# Patient Record
Sex: FEMALE | Race: WHITE | NOT HISPANIC OR LATINO | Employment: FULL TIME | URBAN - METROPOLITAN AREA
[De-identification: names, ages, dates, MRNs, and addresses within clinical notes are randomized per-mention and may not be internally consistent; named-entity substitution may affect disease eponyms.]

---

## 2018-03-01 ENCOUNTER — AMBULATORY CONVERSION ENCOUNTER (OUTPATIENT)
Dept: FAMILY MEDICINE | Facility: CLINIC | Age: 33
End: 2018-03-01

## 2018-04-23 RX ORDER — ARMODAFINIL 150 MG/1
150 TABLET ORAL DAILY
Qty: 30 TABLET | Refills: 0 | Status: SHIPPED | OUTPATIENT
Start: 2018-04-23 | End: 2018-04-23 | Stop reason: SDUPTHER

## 2018-04-23 RX ORDER — ARMODAFINIL 150 MG/1
150 TABLET ORAL DAILY
COMMUNITY
Start: 2017-12-12 | End: 2018-04-23 | Stop reason: SDUPTHER

## 2018-04-23 RX ORDER — ARMODAFINIL 150 MG/1
150 TABLET ORAL DAILY
Qty: 30 TABLET | Refills: 0 | Status: SHIPPED | OUTPATIENT
Start: 2018-04-23 | End: 2018-05-21 | Stop reason: SDUPTHER

## 2018-05-17 PROBLEM — G47.26 SHIFT WORK SLEEP DISORDER: Status: ACTIVE | Noted: 2017-10-18

## 2018-05-17 PROBLEM — L70.0 ACNE VULGARIS: Status: ACTIVE | Noted: 2017-10-18

## 2018-05-17 RX ORDER — MULTIVITAMIN
TABLET ORAL
COMMUNITY

## 2018-05-17 RX ORDER — TRETINOIN 0.5 MG/G
0.05 CREAM TOPICAL
COMMUNITY
Start: 2017-10-18

## 2018-05-21 ENCOUNTER — OFFICE VISIT (OUTPATIENT)
Dept: FAMILY MEDICINE | Facility: CLINIC | Age: 33
End: 2018-05-21
Attending: FAMILY MEDICINE
Payer: COMMERCIAL

## 2018-05-21 VITALS
SYSTOLIC BLOOD PRESSURE: 108 MMHG | HEART RATE: 68 BPM | TEMPERATURE: 98.4 F | WEIGHT: 123.4 LBS | OXYGEN SATURATION: 98 % | DIASTOLIC BLOOD PRESSURE: 70 MMHG | RESPIRATION RATE: 16 BRPM

## 2018-05-21 DIAGNOSIS — G47.26 SHIFT WORK SLEEP DISORDER: Primary | ICD-10-CM

## 2018-05-21 PROCEDURE — 99213 OFFICE O/P EST LOW 20 MIN: CPT | Performed by: FAMILY MEDICINE

## 2018-05-21 RX ORDER — ARMODAFINIL 150 MG/1
150 TABLET ORAL DAILY
Qty: 30 TABLET | Refills: 0 | Status: SHIPPED | OUTPATIENT
Start: 2018-05-21 | End: 2018-07-16 | Stop reason: SDUPTHER

## 2018-05-21 ASSESSMENT — ENCOUNTER SYMPTOMS
LIGHT-HEADEDNESS: 0
WEAKNESS: 0
VOMITING: 0
NUMBNESS: 0
SHORTNESS OF BREATH: 0
DIARRHEA: 0
DIZZINESS: 0
FREQUENCY: 0
COUGH: 0
ABDOMINAL DISTENTION: 0
SLEEP DISTURBANCE: 1
PALPITATIONS: 0
FATIGUE: 1
APPETITE CHANGE: 0
WHEEZING: 0
DIFFICULTY URINATING: 0
CONSTIPATION: 0
ACTIVITY CHANGE: 0
NAUSEA: 0
ABDOMINAL PAIN: 0

## 2018-05-21 NOTE — PROGRESS NOTES
Subjective      Patient ID: Alliana K Kalisch is a 32 y.o. female.    HPI  Started working at [employer] in January  Works 11 pm to 11 am or 3 pm to 3 am  Working 4 days a week  Taking Nuvigil on days she works and on days she doesn't work  She will take half on those ams  Thinks she had a gallbladder attack a few months ago after fatty meal  The following have been reviewed and updated as appropriate in this visit:  Allergies  Meds  Problems       Review of Systems   Constitutional: Positive for fatigue. Negative for activity change and appetite change.   Respiratory: Negative for cough, shortness of breath and wheezing.    Cardiovascular: Negative for chest pain, palpitations and leg swelling.   Gastrointestinal: Negative for abdominal distention, abdominal pain, constipation, diarrhea, nausea and vomiting.   Endocrine: Negative for cold intolerance and heat intolerance.   Genitourinary: Negative for difficulty urinating and frequency.   Skin: Negative for rash.   Neurological: Negative for dizziness, weakness, light-headedness and numbness.   Psychiatric/Behavioral: Positive for sleep disturbance.       Objective     Vitals:    05/21/18 0927   BP: 108/70   Pulse: 68   Resp: 16   Temp: 36.9 °C (98.4 °F)   TempSrc: Oral   SpO2: 98%   Weight: 56 kg (123 lb 6.4 oz)     There is no height or weight on file to calculate BMI.    Physical Exam   Constitutional: She is oriented to person, place, and time. She appears well-developed and well-nourished.   Neck: No JVD present. Carotid bruit is not present.   Cardiovascular: Normal rate, regular rhythm and normal heart sounds.    No murmur heard.  Pulmonary/Chest: Effort normal and breath sounds normal. No respiratory distress. She has no wheezes.   Abdominal: Soft. Bowel sounds are normal. She exhibits no distension and no mass. There is no tenderness. There is no rebound and no guarding. No hernia.   Musculoskeletal: She exhibits no edema or tenderness.    Lymphadenopathy:     She has no cervical adenopathy.   Neurological: She is alert and oriented to person, place, and time.   Skin: Skin is warm and dry.   Psychiatric: She has a normal mood and affect.   Nursing note and vitals reviewed.      Assessment/Plan   Problem List Items Addressed This Visit     Shift work sleep disorder - Primary     PMPAWARxE website queried no abnormalities noted.  Patient is only receiving controlled substances from my office.  With current regimen there are no side effects noticed and no signs of addiction.  There is no drug seeking behavior.  The medications allow patient to perform activities of daily living  Nuvigil sent electronically         Relevant Medications    armodafinil (NUVIGIL) 150 mg tablet        Return in about 6 months (around 11/21/2018).  PVU

## 2018-05-21 NOTE — ASSESSMENT & PLAN NOTE
[employer] website queried no abnormalities noted.  Patient is only receiving controlled substances from my office.  With current regimen there are no side effects noticed and no signs of addiction.  There is no drug seeking behavior.  The medications allow patient to perform activities of daily living  Nuvigil sent electronically

## 2018-07-16 DIAGNOSIS — G47.26 SHIFT WORK SLEEP DISORDER: ICD-10-CM

## 2018-07-16 RX ORDER — ARMODAFINIL 150 MG/1
TABLET ORAL
Qty: 30 TABLET | Refills: 0 | Status: SHIPPED | OUTPATIENT
Start: 2018-07-16 | End: 2018-09-13 | Stop reason: SDUPTHER

## 2018-07-16 NOTE — TELEPHONE ENCOUNTER
PMP[employer] website queried no abnormalities noted.  Patient is only receiving controlled substances from my office  Prescription sent electronically

## 2018-08-02 ENCOUNTER — OFFICE VISIT (OUTPATIENT)
Dept: FAMILY MEDICINE | Facility: CLINIC | Age: 33
End: 2018-08-02
Payer: COMMERCIAL

## 2018-08-02 VITALS
BODY MASS INDEX: 20.62 KG/M2 | TEMPERATURE: 98.6 F | HEART RATE: 80 BPM | HEIGHT: 65 IN | RESPIRATION RATE: 16 BRPM | DIASTOLIC BLOOD PRESSURE: 62 MMHG | OXYGEN SATURATION: 98 % | SYSTOLIC BLOOD PRESSURE: 110 MMHG | WEIGHT: 123.8 LBS

## 2018-08-02 DIAGNOSIS — N92.6 IRREGULAR MENSES: Primary | ICD-10-CM

## 2018-08-02 PROCEDURE — 99213 OFFICE O/P EST LOW 20 MIN: CPT | Performed by: FAMILY MEDICINE

## 2018-08-02 RX ORDER — LEVONORGESTREL/ETHIN.ESTRADIOL 0.1-0.02MG
1 TABLET ORAL DAILY
Qty: 28 TABLET | Refills: 11 | Status: SHIPPED | OUTPATIENT
Start: 2018-08-02 | End: 2018-08-03 | Stop reason: SDUPTHER

## 2018-08-02 NOTE — PROGRESS NOTES
"Subjective      Patient ID: Alliana K Kalisch is a 32 y.o. female.    HPI  Pt presents for irregular menses  Will have it for 9 days and not get it regularly  3 to 6 weeks apart depending   Last Pap 3 years ago  Mom went through menopause before 40  The following have been reviewed and updated as appropriate in this visit:  Allergies  Meds  Problems       Review of Systems   Genitourinary: Positive for menstrual problem.       Objective     Vitals:    08/02/18 0920   BP: 110/62   BP Location: Left upper arm   Patient Position: Sitting   Pulse: 80   Resp: 16   Temp: 37 °C (98.6 °F)   TempSrc: Oral   SpO2: 98%   Weight: 56.2 kg (123 lb 12.8 oz)   Height: 1.657 m (5' 5.25\")     Body mass index is 20.44 kg/m².    Physical Exam   Constitutional: She is oriented to person, place, and time. She appears well-developed and well-nourished.   Neurological: She is alert and oriented to person, place, and time.   Psychiatric: She has a normal mood and affect.   Nursing note and vitals reviewed.      Assessment/Plan   Problem List Items Addressed This Visit     None      Visit Diagnoses     Irregular menses    -  Primary    will start aviane  check labs      Relevant Medications    levonorgestrel-ethinyl estrad (AVIANE) 0.1-20 mg-mcg per tablet    Other Relevant Orders    TSH    Estrogens, Total Serum    Progesterone    FSH/LH    Testosterone, total and free      Return after 10/18/18 for  Pe and pap.  PVU  "

## 2018-08-03 DIAGNOSIS — N92.6 IRREGULAR MENSES: ICD-10-CM

## 2018-08-03 LAB
FSH SERPL-ACNC: 6.3 MIU/ML
LH SERPL-ACNC: 12 MIU/ML
PROGEST SERPL-MCNC: 0.2 NG/ML
TESTOST FREE SERPL-MCNC: 3.8 PG/ML (ref 0–4.2)
TESTOST SERPL-MCNC: 54 NG/DL (ref 8–48)
TSH SERPL DL<=0.005 MIU/L-ACNC: 0.79 UIU/ML (ref 0.45–4.5)

## 2018-08-03 RX ORDER — LEVONORGESTREL/ETHIN.ESTRADIOL 0.1-0.02MG
1 TABLET ORAL DAILY
Qty: 84 TABLET | Refills: 3 | Status: SHIPPED | OUTPATIENT
Start: 2018-08-03 | End: 2018-09-02 | Stop reason: HOSPADM

## 2018-08-05 LAB — ESTROGEN SERPL-MCNC: 157 PG/ML

## 2018-09-13 DIAGNOSIS — G47.26 SHIFT WORK SLEEP DISORDER: ICD-10-CM

## 2018-09-13 RX ORDER — ARMODAFINIL 150 MG/1
TABLET ORAL
Qty: 30 TABLET | Refills: 0 | Status: SHIPPED | OUTPATIENT
Start: 2018-09-13 | End: 2018-10-19 | Stop reason: SDUPTHER

## 2018-10-19 ENCOUNTER — OFFICE VISIT (OUTPATIENT)
Dept: FAMILY MEDICINE | Facility: CLINIC | Age: 33
End: 2018-10-19
Payer: COMMERCIAL

## 2018-10-19 VITALS
HEIGHT: 65 IN | HEART RATE: 71 BPM | RESPIRATION RATE: 18 BRPM | TEMPERATURE: 98 F | WEIGHT: 126.6 LBS | OXYGEN SATURATION: 99 % | BODY MASS INDEX: 21.09 KG/M2 | SYSTOLIC BLOOD PRESSURE: 110 MMHG | DIASTOLIC BLOOD PRESSURE: 70 MMHG

## 2018-10-19 DIAGNOSIS — Z12.4 CERVICAL CANCER SCREENING: ICD-10-CM

## 2018-10-19 DIAGNOSIS — Z00.00 PHYSICAL EXAM: Primary | ICD-10-CM

## 2018-10-19 DIAGNOSIS — G47.26 SHIFT WORK SLEEP DISORDER: ICD-10-CM

## 2018-10-19 PROCEDURE — 99395 PREV VISIT EST AGE 18-39: CPT | Performed by: FAMILY MEDICINE

## 2018-10-19 RX ORDER — ARMODAFINIL 150 MG/1
TABLET ORAL
Qty: 30 TABLET | Refills: 0 | Status: SHIPPED | OUTPATIENT
Start: 2018-10-19 | End: 2018-12-18 | Stop reason: SDUPTHER

## 2018-10-19 RX ORDER — TIMOLOL MALEATE 5 MG/ML
SOLUTION/ DROPS OPHTHALMIC
Refills: 11 | COMMUNITY
Start: 2018-09-20 | End: 2018-10-19

## 2018-10-19 RX ORDER — LEVONORGESTREL AND ETHINYL ESTRADIOL 0.15-0.03
1 KIT ORAL DAILY
Qty: 28 TABLET | Refills: 12 | Status: SHIPPED | OUTPATIENT
Start: 2018-10-19 | End: 2020-01-16 | Stop reason: SDUPTHER

## 2018-10-19 ASSESSMENT — ENCOUNTER SYMPTOMS
COUGH: 0
NERVOUS/ANXIOUS: 0
DIZZINESS: 0
ACTIVITY CHANGE: 0
FREQUENCY: 0
SORE THROAT: 0
DYSURIA: 0
CONSTIPATION: 0
FATIGUE: 0
APNEA: 0
VOICE CHANGE: 0
DIARRHEA: 0
SLEEP DISTURBANCE: 0
APPETITE CHANGE: 0
HEADACHES: 0
WEAKNESS: 0
LIGHT-HEADEDNESS: 0
JOINT SWELLING: 0
PHOTOPHOBIA: 0
RHINORRHEA: 0
NAUSEA: 0
NUMBNESS: 0
TROUBLE SWALLOWING: 0
BACK PAIN: 0
EYE PAIN: 0
VOMITING: 0
UNEXPECTED WEIGHT CHANGE: 0
NECK PAIN: 0
CHEST TIGHTNESS: 0
ABDOMINAL PAIN: 0
ABDOMINAL DISTENTION: 0
EYE REDNESS: 0
PALPITATIONS: 0
HEMATURIA: 0
BLOOD IN STOOL: 0
EYE DISCHARGE: 0
SHORTNESS OF BREATH: 0
DIFFICULTY URINATING: 0
SINUS PAIN: 0
WHEEZING: 0

## 2018-10-19 NOTE — PROGRESS NOTES
"Subjective      Patient ID: Alliana K Kalisch is a 33 y.o. female.  1985      HPI  Pt presents for pap  New partner for about 2 months  Using condoms  Getting some spotting in between menses  Doing ok overall with Nuvigil    Never had abnormal pap last pap about 3.5 years  The following have been reviewed and updated as appropriate in this visit:  Tobacco  Allergies  Meds  Problems  Med Hx  Fam Hx  Soc Hx      Review of Systems   Constitutional: Negative for activity change, appetite change, fatigue and unexpected weight change.   HENT: Negative for congestion, ear pain, hearing loss, rhinorrhea, sinus pain, sneezing, sore throat, trouble swallowing and voice change.    Eyes: Negative for photophobia, pain, discharge, redness and visual disturbance.   Respiratory: Negative for apnea, cough, chest tightness, shortness of breath and wheezing.    Cardiovascular: Negative for chest pain, palpitations and leg swelling.   Gastrointestinal: Negative for abdominal distention, abdominal pain, blood in stool, constipation, diarrhea, nausea and vomiting.   Endocrine: Negative for cold intolerance and heat intolerance.   Genitourinary: Positive for menstrual problem (spotting). Negative for decreased urine volume, difficulty urinating, dysuria, frequency, hematuria, urgency, vaginal bleeding, vaginal discharge and vaginal pain.   Musculoskeletal: Negative for back pain, joint swelling and neck pain.   Skin: Negative for rash.   Neurological: Negative for dizziness, weakness, light-headedness, numbness and headaches.   Psychiatric/Behavioral: Negative for sleep disturbance. The patient is not nervous/anxious.        Objective     Vitals:    10/19/18 1058   BP: 110/70   Pulse: 71   Resp: 18   Temp: 36.7 °C (98 °F)   SpO2: 99%   Weight: 57.4 kg (126 lb 9.6 oz)   Height: 1.657 m (5' 5.25\")     Body mass index is 20.91 kg/m².    Physical Exam   Constitutional: She is oriented to person, place, and time. She appears " well-developed and well-nourished.   HENT:   Head: Normocephalic.   Right Ear: Tympanic membrane, external ear and ear canal normal.   Left Ear: Tympanic membrane, external ear and ear canal normal.   Nose: Nose normal.   Eyes: Conjunctivae and EOM are normal. Pupils are equal, round, and reactive to light.   Neck: No JVD present.   Cardiovascular: Normal rate and regular rhythm.    No murmur heard.  Pulmonary/Chest: Effort normal and breath sounds normal. No respiratory distress. She has no wheezes. Right breast exhibits no inverted nipple, no mass, no nipple discharge, no skin change and no tenderness. Left breast exhibits no inverted nipple, no mass, no nipple discharge, no skin change and no tenderness.   Abdominal: Soft. Bowel sounds are normal. She exhibits no distension and no mass. There is no tenderness. There is no rebound and no guarding. No hernia.   Genitourinary: Vagina normal and uterus normal. There is no rash on the right labia. There is no rash on the left labia. Cervix exhibits no motion tenderness, no discharge and no friability. Right adnexum displays no mass. Left adnexum displays no mass.   Musculoskeletal: She exhibits no edema or tenderness.   Lymphadenopathy:     She has no cervical adenopathy.   Neurological: She is alert and oriented to person, place, and time. She displays normal reflexes. No sensory deficit.   Skin: Skin is warm and dry.   Psychiatric: She has a normal mood and affect.   Nursing note and vitals reviewed.      Assessment/Plan   Problem List Items Addressed This Visit     Shift work sleep disorder     PMP[employer]AR[employer] website queried no abnormalities noted.  Patient is only receiving controlled substances from my office.  With current regimen there are no side effects noticed and no signs of addiction.  There is no drug seeking behavior.  The medications allow patient to perform activities of daily living and provide adequate anesthesia  nuvigil sent electronically          Relevant Medications    armodafinil (NUVIGIL) 150 mg tablet      Other Visit Diagnoses     Physical exam    -  Primary    diet/exercise   check labs  pap done    Relevant Orders    Lipid panel    Comprehensive metabolic panel    Chlamydia/GC RNA:ThinPrep,Urine,Swab    Cytology, Thinprep Pap    PAP AND HR HPV W/REFLEX GENOTYPING 16,18/45 & CT/NG/TV    Cervical cancer screening        pap with HPV  GC/CT due to new relationship      Return in about 6 months (around 4/19/2019).  PVU

## 2018-10-19 NOTE — ASSESSMENT & PLAN NOTE
P[employer] website queried no abnormalities noted.  Patient is only receiving controlled substances from my office.  With current regimen there are no side effects noticed and no signs of addiction.  There is no drug seeking behavior.  The medications allow patient to perform activities of daily living and provide adequate anesthesia  nuvigil sent electronically

## 2018-10-23 LAB
C TRACH RRNA CVX QL NAA+PROBE: NEGATIVE
CYTOLOGIST CVX/VAG CYTO: NORMAL
CYTOLOGY CVX/VAG DOC THIN PREP: NORMAL
DX ICD CODE: NORMAL
HPV I/H RISK 4 DNA CVX QL PROBE+SIG AMP: NEGATIVE
LAB CORP NOTE:: NORMAL
N GONORRHOEA RRNA CVX QL NAA+PROBE: NEGATIVE
OTHER STN SPEC: NORMAL
PATH REPORT.FINAL DX SPEC: NORMAL
STAT OF ADQ CVX/VAG CYTO-IMP: NORMAL
T VAGINALIS RRNA SPEC QL NAA+PROBE: NEGATIVE

## 2018-10-24 ENCOUNTER — TELEPHONE (OUTPATIENT)
Dept: FAMILY MEDICINE | Facility: CLINIC | Age: 33
End: 2018-10-24

## 2018-10-24 NOTE — TELEPHONE ENCOUNTER
----- Message from Katharine Torres DO sent at 10/23/2018  6:47 PM EDT -----  Please call Pap and HPV were negative

## 2018-12-18 DIAGNOSIS — G47.26 SHIFT WORK SLEEP DISORDER: ICD-10-CM

## 2018-12-18 RX ORDER — ARMODAFINIL 150 MG/1
TABLET ORAL
Qty: 30 TABLET | Refills: 0 | Status: SHIPPED | OUTPATIENT
Start: 2018-12-18 | End: 2019-01-28 | Stop reason: SDUPTHER

## 2019-01-28 DIAGNOSIS — G47.26 SHIFT WORK SLEEP DISORDER: ICD-10-CM

## 2019-01-28 RX ORDER — ARMODAFINIL 150 MG/1
TABLET ORAL
Qty: 30 TABLET | Refills: 0 | Status: SHIPPED | OUTPATIENT
Start: 2019-01-28 | End: 2019-03-07 | Stop reason: SDUPTHER

## 2019-01-28 NOTE — TELEPHONE ENCOUNTER
Last filled: 12/18/2018  Last seen: 10/19/018  Next appt:   please advise. Augustina Cramer MA

## 2019-02-12 ENCOUNTER — OFFICE VISIT (OUTPATIENT)
Dept: FAMILY MEDICINE | Facility: CLINIC | Age: 34
End: 2019-02-12
Payer: COMMERCIAL

## 2019-02-12 VITALS
TEMPERATURE: 98.6 F | DIASTOLIC BLOOD PRESSURE: 60 MMHG | SYSTOLIC BLOOD PRESSURE: 88 MMHG | BODY MASS INDEX: 19.82 KG/M2 | WEIGHT: 120 LBS | HEART RATE: 79 BPM | OXYGEN SATURATION: 98 %

## 2019-02-12 DIAGNOSIS — N92.6 IRREGULAR MENSES: ICD-10-CM

## 2019-02-12 DIAGNOSIS — G47.26 SHIFT WORK SLEEP DISORDER: ICD-10-CM

## 2019-02-12 DIAGNOSIS — N92.6 IRREGULAR MENSES: Primary | ICD-10-CM

## 2019-02-12 PROCEDURE — 99213 OFFICE O/P EST LOW 20 MIN: CPT | Performed by: FAMILY MEDICINE

## 2019-02-12 RX ORDER — NORELGESTROMIN AND ETHINYL ESTRADIOL 35; 150 UG/MG; UG/MG
PATCH TRANSDERMAL
Qty: 9 PATCH | Refills: 3 | Status: SHIPPED | OUTPATIENT
Start: 2019-02-12 | End: 2019-02-25

## 2019-02-12 RX ORDER — NORELGESTROMIN AND ETHINYL ESTRADIOL 35; 150 UG/MG; UG/MG
PATCH TRANSDERMAL
Qty: 3 PATCH | Refills: 12 | Status: SHIPPED | OUTPATIENT
Start: 2019-02-12 | End: 2019-02-12 | Stop reason: SDUPTHER

## 2019-02-12 ASSESSMENT — ENCOUNTER SYMPTOMS
WHEEZING: 0
DIZZINESS: 0
DIFFICULTY URINATING: 0
FREQUENCY: 0
CONSTIPATION: 0
VOMITING: 0
LIGHT-HEADEDNESS: 0
DIARRHEA: 0
FATIGUE: 0
COUGH: 0
NUMBNESS: 0
WEAKNESS: 0
SHORTNESS OF BREATH: 0
APPETITE CHANGE: 0
ABDOMINAL PAIN: 0
ABDOMINAL DISTENTION: 0
NAUSEA: 0
ACTIVITY CHANGE: 0
PALPITATIONS: 0

## 2019-02-12 NOTE — PROGRESS NOTES
Subjective      Patient ID: Alliana K Kalisch is a 33 y.o. female.  1985      HPI  Night shift work  And uses Nuvigil working more hours  And different shifts  Irregular menses  Not taking pills regularly because they make her nauseous  Would like to do patch  Had LMP 2 weeks ago intermittently  The following have been reviewed and updated as appropriate in this visit:  Allergies  Meds  Problems       Review of Systems   Constitutional: Negative for activity change, appetite change and fatigue.   Respiratory: Negative for cough, shortness of breath and wheezing.    Cardiovascular: Negative for chest pain, palpitations and leg swelling.   Gastrointestinal: Negative for abdominal distention, abdominal pain, constipation, diarrhea, nausea and vomiting.   Endocrine: Negative for cold intolerance and heat intolerance.   Genitourinary: Positive for menstrual problem (not taking pill consistently). Negative for difficulty urinating and frequency.   Skin: Negative for rash.   Neurological: Negative for dizziness, weakness, light-headedness and numbness.       Objective     Vitals:    02/12/19 1036   BP: (!) 88/60   BP Location: Left upper arm   Patient Position: Sitting   Pulse: 79   Temp: 37 °C (98.6 °F)   TempSrc: Oral   SpO2: 98%   Weight: 54.4 kg (120 lb)     Body mass index is 19.82 kg/m².    Physical Exam   Constitutional: She is oriented to person, place, and time. She appears well-developed and well-nourished.   Cardiovascular: Normal rate and regular rhythm.    No murmur heard.  Pulmonary/Chest: Effort normal. She has no wheezes.   Abdominal: Soft. There is no tenderness.   Neurological: She is alert and oriented to person, place, and time.   Psychiatric: She has a normal mood and affect.   Nursing note and vitals reviewed.      Assessment/Plan   Diagnoses and all orders for this visit:    Irregular menses (Primary)  Comments:  will switch to Ortho Evra patch and monitor  Orders:  -      norelgestromin-ethin.estradiol (ORTHO EVRA) 150-35 mcg/24 hr; Apply 1 patch each week for 3 weeks, then remove for 1 week.    Shift work sleep disorder  Assessment & Plan:  Continue nuvigil  Discussed controlled substance contract and pt signed new contract          Current Outpatient Prescriptions:   •  armodafinil (NUVIGIL) 150 mg tablet, Take 1 tablet PO as needed for night shift work, Disp: 30 tablet, Rfl: 0  •  levonorgestrel-ethinyl estrad (NORDETTE) 0.15-0.03 mg per tablet, Take 1 tablet by mouth daily., Disp: 28 tablet, Rfl: 12  •  multivitamin (THERAGRAN) tablet, take 1 tablet by oral route  every day, Disp: , Rfl:   •  tretinoin (RETIN-A) 0.05 % cream, 0.05 %., Disp: , Rfl:   •  norelgestromin-ethin.estradiol (ORTHO EVRA) 150-35 mcg/24 hr, Apply 1 patch each week for 3 weeks, then remove for 1 week., Disp: 3 patch, Rfl: 12  Return in about 6 months (around 8/12/2019).  PVU

## 2019-02-25 ENCOUNTER — OFFICE VISIT (OUTPATIENT)
Dept: FAMILY MEDICINE | Facility: CLINIC | Age: 34
End: 2019-02-25
Payer: COMMERCIAL

## 2019-02-25 ENCOUNTER — TELEPHONE (OUTPATIENT)
Dept: FAMILY MEDICINE | Facility: CLINIC | Age: 34
End: 2019-02-25

## 2019-02-25 VITALS
WEIGHT: 119 LBS | SYSTOLIC BLOOD PRESSURE: 90 MMHG | TEMPERATURE: 98.6 F | BODY MASS INDEX: 19.65 KG/M2 | OXYGEN SATURATION: 98 % | DIASTOLIC BLOOD PRESSURE: 60 MMHG | HEART RATE: 68 BPM

## 2019-02-25 DIAGNOSIS — K64.9 HEMORRHOIDS, UNSPECIFIED HEMORRHOID TYPE: Primary | ICD-10-CM

## 2019-02-25 PROCEDURE — 99212 OFFICE O/P EST SF 10 MIN: CPT | Performed by: FAMILY MEDICINE

## 2019-02-25 RX ORDER — HYDROCORTISONE ACETATE 25 MG/1
25 SUPPOSITORY RECTAL 2 TIMES DAILY
Qty: 20 SUPPOSITORY | Refills: 0 | Status: SHIPPED | OUTPATIENT
Start: 2019-02-25 | End: 2020-12-30

## 2019-02-25 ASSESSMENT — ENCOUNTER SYMPTOMS
BLOOD IN STOOL: 1
RECTAL PAIN: 1
ROS GI COMMENTS: HEMORRHOIDS
ABDOMINAL PAIN: 0

## 2019-02-25 NOTE — LETTER
February 25, 2019     Patient: Alliana K Kalisch   YOB: 1985   Date of Visit: 2/25/2019       To Whom It May Concern:    It is my medical opinion that Alliana K Kalisch should remain out of work until 2/26/19.    If you have any questions or concerns, please don't hesitate to call.         Sincerely,          Katharine Torres, DO    CC: No Recipients

## 2019-02-25 NOTE — PROGRESS NOTES
Subjective      Patient ID: Alliana K Kalisch is a 33 y.o. female.  1985      HPI  Pt presents for follow up from [facility] Saturday am because of painful hemorrhoid and had removal of clots.  Pain has improved still having some bleeding    The following have been reviewed and updated as appropriate in this visit:  Allergies  Meds  Problems       Review of Systems   Gastrointestinal: Positive for blood in stool and rectal pain. Negative for abdominal pain.        Hemorrhoids         Objective     Vitals:    02/25/19 1355   BP: 90/60   BP Location: Left upper arm   Patient Position: Sitting   Pulse: 68   Temp: 37 °C (98.6 °F)   TempSrc: Oral   SpO2: 98%   Weight: 54 kg (119 lb)     Body mass index is 19.65 kg/m².    Physical Exam   Constitutional: She is oriented to person, place, and time. She appears well-developed and well-nourished.   Genitourinary: Rectal exam shows internal hemorrhoid and tenderness. Rectal exam shows no external hemorrhoid.   Neurological: She is alert and oriented to person, place, and time.   Psychiatric: She has a normal mood and affect.   Nursing note and vitals reviewed.      Assessment/Plan   Diagnoses and all orders for this visit:    Hemorrhoids, unspecified hemorrhoid type (Primary)  Comments:  anusol suppositories  make appt with surgeon for evaluation  Orders:  -     hydrocortisone (ANUSOL-HC) 25 mg suppository; Insert 1 suppository (25 mg total) into the rectum 2 (two) times a day for 10 days.      Return if symptoms worsen or fail to improve.  PVU

## 2019-03-07 DIAGNOSIS — G47.26 SHIFT WORK SLEEP DISORDER: ICD-10-CM

## 2019-03-07 RX ORDER — ARMODAFINIL 150 MG/1
TABLET ORAL
Qty: 30 TABLET | Refills: 0 | Status: SHIPPED | OUTPATIENT
Start: 2019-03-07 | End: 2019-04-09 | Stop reason: SDUPTHER

## 2019-04-09 DIAGNOSIS — G47.26 SHIFT WORK SLEEP DISORDER: ICD-10-CM

## 2019-04-09 RX ORDER — ARMODAFINIL 150 MG/1
TABLET ORAL
Qty: 30 TABLET | Refills: 0 | Status: SHIPPED | OUTPATIENT
Start: 2019-04-09 | End: 2019-05-17 | Stop reason: SDUPTHER

## 2019-05-17 DIAGNOSIS — G47.26 SHIFT WORK SLEEP DISORDER: ICD-10-CM

## 2019-05-17 RX ORDER — ARMODAFINIL 150 MG/1
TABLET ORAL
Qty: 30 TABLET | Refills: 0 | Status: SHIPPED | OUTPATIENT
Start: 2019-05-17 | End: 2019-06-19 | Stop reason: SDUPTHER

## 2019-05-24 ENCOUNTER — OFFICE VISIT (OUTPATIENT)
Dept: FAMILY MEDICINE | Facility: CLINIC | Age: 34
End: 2019-05-24
Payer: COMMERCIAL

## 2019-05-24 VITALS
WEIGHT: 120 LBS | RESPIRATION RATE: 16 BRPM | SYSTOLIC BLOOD PRESSURE: 110 MMHG | TEMPERATURE: 97.8 F | OXYGEN SATURATION: 98 % | BODY MASS INDEX: 19.82 KG/M2 | HEART RATE: 68 BPM | DIASTOLIC BLOOD PRESSURE: 64 MMHG

## 2019-05-24 DIAGNOSIS — R05.9 COUGH: Primary | ICD-10-CM

## 2019-05-24 PROCEDURE — 99213 OFFICE O/P EST LOW 20 MIN: CPT | Performed by: FAMILY MEDICINE

## 2019-05-24 RX ORDER — PREDNISONE 10 MG/1
TABLET ORAL
Qty: 42 TABLET | Refills: 0 | Status: SHIPPED | OUTPATIENT
Start: 2019-05-24 | End: 2019-10-01

## 2019-05-24 ASSESSMENT — ENCOUNTER SYMPTOMS
SINUS PAIN: 0
COUGH: 1
ABDOMINAL PAIN: 0
HEADACHES: 0
SINUS PRESSURE: 0
SORE THROAT: 0
ACTIVITY CHANGE: 0
TROUBLE SWALLOWING: 0
DIZZINESS: 0
FEVER: 0
SHORTNESS OF BREATH: 1
FATIGUE: 0
RHINORRHEA: 0
CHEST TIGHTNESS: 1
DIARRHEA: 0
WHEEZING: 0
NAUSEA: 0

## 2019-05-24 NOTE — PROGRESS NOTES
Subjective      Patient ID: Alliana K Kalisch is a 33 y.o. female.  1985      HPI  Had cough and felt tight for about  2 weeks,  Monday was feeling better but yesterday started feeling worse  Had sore throat and congestion and runny nose  Used albuterol which did help  But not now  The following have been reviewed and updated as appropriate in this visit:  Allergies  Meds  Problems       Review of Systems   Constitutional: Negative for activity change, fatigue and fever.   HENT: Negative for congestion, ear pain, postnasal drip, rhinorrhea, sinus pain, sinus pressure, sore throat and trouble swallowing.    Respiratory: Positive for cough, chest tightness and shortness of breath. Negative for wheezing.    Cardiovascular: Negative for chest pain.   Gastrointestinal: Negative for abdominal pain, diarrhea and nausea.   Neurological: Negative for dizziness and headaches.       Objective     Vitals:    05/24/19 1128   BP: 110/64   BP Location: Left upper arm   Patient Position: Sitting   Pulse: 68   Resp: 16   Temp: 36.6 °C (97.8 °F)   TempSrc: Oral   SpO2: 98%   Weight: 54.4 kg (120 lb)     Body mass index is 19.82 kg/m².    Physical Exam   Constitutional: She is oriented to person, place, and time. She appears well-developed and well-nourished.   Cardiovascular: Normal rate and regular rhythm.    No murmur heard.  Pulmonary/Chest: Effort normal. She has decreased breath sounds.   Abdominal: Soft. There is no tenderness.   Neurological: She is alert and oriented to person, place, and time.   Psychiatric: She has a normal mood and affect.   Nursing note and vitals reviewed.      Assessment/Plan   Diagnoses and all orders for this visit:    Cough (Primary)  Comments:  prednisone taper  fluids  Orders:  -     predniSONE (DELTASONE) 10 mg tablet; 6 PO for 2 days 5 PO for 2 days 4 po FOR 2 DAYS 3 po FOR DAYS 2 po FOR 2 DAYS 1 po FOR 2 DAys    Return if symptoms worsen or fail to improve.  PVU

## 2019-06-19 DIAGNOSIS — G47.26 SHIFT WORK SLEEP DISORDER: ICD-10-CM

## 2019-06-19 RX ORDER — ARMODAFINIL 150 MG/1
TABLET ORAL
Qty: 30 TABLET | Refills: 0 | Status: SHIPPED | OUTPATIENT
Start: 2019-06-19 | End: 2019-08-05 | Stop reason: SDUPTHER

## 2019-06-19 NOTE — TELEPHONE ENCOUNTER
Medicine Refill Request  [facility] rx last filled 5/17/19  Last Office Visit: 5/24/2019  Next Office Visit: Visit date not found        Current Outpatient Prescriptions:   •  armodafinil (NUVIGIL) 150 mg tablet, Take 1 tablet PO as needed for night shift work, Disp: 30 tablet, Rfl: 0  •  hydrocortisone (ANUSOL-HC) 25 mg suppository, Insert 1 suppository (25 mg total) into the rectum 2 (two) times a day for 10 days., Disp: 20 suppository, Rfl: 0  •  levonorgestrel-ethinyl estrad (NORDETTE) 0.15-0.03 mg per tablet, Take 1 tablet by mouth daily., Disp: 28 tablet, Rfl: 12  •  multivitamin (THERAGRAN) tablet, take 1 tablet by oral route  every day, Disp: , Rfl:   •  predniSONE (DELTASONE) 10 mg tablet, 6 PO for 2 days 5 PO for 2 days 4 po FOR 2 DAYS 3 po FOR DAYS 2 po FOR 2 DAYS 1 po FOR 2 DAys, Disp: 42 tablet, Rfl: 0  •  tretinoin (RETIN-A) 0.05 % cream, 0.05 %., Disp: , Rfl:       BP Readings from Last 3 Encounters:   05/24/19 110/64   02/25/19 90/60   02/12/19 (!) 88/60       Recent Lab results:  No results found for: CHOL, No results found for: HDL, No results found for: LDLCALC, No results found for: TRIG     No results found for: GLUCOSE, No results found for: HGBA1C      No results found for: CREATININE    Lab Results   Component Value Date    TSH 0.793 08/02/2018

## 2019-08-05 DIAGNOSIS — G47.26 SHIFT WORK SLEEP DISORDER: ICD-10-CM

## 2019-08-05 RX ORDER — ARMODAFINIL 150 MG/1
TABLET ORAL
Qty: 30 TABLET | Refills: 0 | Status: SHIPPED | OUTPATIENT
Start: 2019-08-05 | End: 2019-09-12 | Stop reason: SDUPTHER

## 2019-08-05 NOTE — TELEPHONE ENCOUNTER
Medicine Refill Request  [facility] rx last filled 06/19/19  Last Office Visit: 5/24/2019  Next Office Visit: Visit date not found  Patient is due for six months f/u, will call to schedule an appt    Current Outpatient Prescriptions:   •  armodafinil (NUVIGIL) 150 mg tablet, Take 1 tablet PO as needed for night shift work, Disp: 30 tablet, Rfl: 0  •  hydrocortisone (ANUSOL-HC) 25 mg suppository, Insert 1 suppository (25 mg total) into the rectum 2 (two) times a day for 10 days., Disp: 20 suppository, Rfl: 0  •  levonorgestrel-ethinyl estrad (NORDETTE) 0.15-0.03 mg per tablet, Take 1 tablet by mouth daily., Disp: 28 tablet, Rfl: 12  •  multivitamin (THERAGRAN) tablet, take 1 tablet by oral route  every day, Disp: , Rfl:   •  predniSONE (DELTASONE) 10 mg tablet, 6 PO for 2 days 5 PO for 2 days 4 po FOR 2 DAYS 3 po FOR DAYS 2 po FOR 2 DAYS 1 po FOR 2 DAys, Disp: 42 tablet, Rfl: 0  •  tretinoin (RETIN-A) 0.05 % cream, 0.05 %., Disp: , Rfl:       BP Readings from Last 3 Encounters:   05/24/19 110/64   02/25/19 90/60   02/12/19 (!) 88/60       Recent Lab results:  No results found for: CHOL, No results found for: HDL, No results found for: LDLCALC, No results found for: TRIG     No results found for: GLUCOSE, No results found for: HGBA1C      No results found for: CREATININE    Lab Results   Component Value Date    TSH 0.793 08/02/2018

## 2019-09-12 DIAGNOSIS — G47.26 SHIFT WORK SLEEP DISORDER: ICD-10-CM

## 2019-09-12 RX ORDER — ARMODAFINIL 150 MG/1
TABLET ORAL
Qty: 30 TABLET | Refills: 0 | Status: SHIPPED | OUTPATIENT
Start: 2019-09-12 | End: 2019-10-23 | Stop reason: SDUPTHER

## 2019-09-12 NOTE — TELEPHONE ENCOUNTER
Medicine Refill Request  [facility] Rx  Last Office Visit: 5/24/2019  Next Office Visit: 10/1/2019        Current Outpatient Prescriptions:   •  armodafinil (NUVIGIL) 150 mg tablet, Take 1 tablet PO as needed for night shift work, Disp: 30 tablet, Rfl: 0  •  hydrocortisone (ANUSOL-HC) 25 mg suppository, Insert 1 suppository (25 mg total) into the rectum 2 (two) times a day for 10 days., Disp: 20 suppository, Rfl: 0  •  levonorgestrel-ethinyl estrad (NORDETTE) 0.15-0.03 mg per tablet, Take 1 tablet by mouth daily., Disp: 28 tablet, Rfl: 12  •  multivitamin (THERAGRAN) tablet, take 1 tablet by oral route  every day, Disp: , Rfl:   •  predniSONE (DELTASONE) 10 mg tablet, 6 PO for 2 days 5 PO for 2 days 4 po FOR 2 DAYS 3 po FOR DAYS 2 po FOR 2 DAYS 1 po FOR 2 DAys, Disp: 42 tablet, Rfl: 0  •  tretinoin (RETIN-A) 0.05 % cream, 0.05 %., Disp: , Rfl:       BP Readings from Last 3 Encounters:   05/24/19 110/64   02/25/19 90/60   02/12/19 (!) 88/60       Recent Lab results:  No results found for: CHOL, No results found for: HDL, No results found for: LDLCALC, No results found for: TRIG     No results found for: GLUCOSE, No results found for: HGBA1C      No results found for: CREATININE    Lab Results   Component Value Date    TSH 0.793 08/02/2018

## 2019-10-01 ENCOUNTER — OFFICE VISIT (OUTPATIENT)
Dept: FAMILY MEDICINE | Facility: CLINIC | Age: 34
End: 2019-10-01
Payer: COMMERCIAL

## 2019-10-01 VITALS
SYSTOLIC BLOOD PRESSURE: 100 MMHG | HEART RATE: 68 BPM | WEIGHT: 123 LBS | TEMPERATURE: 98.1 F | RESPIRATION RATE: 16 BRPM | BODY MASS INDEX: 20.31 KG/M2 | DIASTOLIC BLOOD PRESSURE: 60 MMHG | OXYGEN SATURATION: 98 %

## 2019-10-01 DIAGNOSIS — G47.26 SHIFT WORK SLEEP DISORDER: Primary | ICD-10-CM

## 2019-10-01 PROCEDURE — 99213 OFFICE O/P EST LOW 20 MIN: CPT | Performed by: FAMILY MEDICINE

## 2019-10-01 ASSESSMENT — ENCOUNTER SYMPTOMS
SHORTNESS OF BREATH: 0
WHEEZING: 0
DIZZINESS: 0
FATIGUE: 1
CONSTIPATION: 0
SLEEP DISTURBANCE: 0
LIGHT-HEADEDNESS: 0
NAUSEA: 0
COUGH: 0
DYSPHORIC MOOD: 0
NUMBNESS: 0
ABDOMINAL DISTENTION: 0
ABDOMINAL PAIN: 0
ACTIVITY CHANGE: 0
FREQUENCY: 0
VOMITING: 0
DIFFICULTY URINATING: 0
DIARRHEA: 0
NERVOUS/ANXIOUS: 0
WEAKNESS: 0
PALPITATIONS: 0
APPETITE CHANGE: 0

## 2019-10-01 NOTE — PROGRESS NOTES
Subjective      Patient ID: Alliana K Kalisch is a 34 y.o. female.  1985      HPI  Shift work sleep disorder   Now doing 3 pm to 3 am  11 am to 11 pm  Going to buy a house in a month  And will be cutting down then  Taking nuvigil which helps  Sleeps well when she can sleep  Co parenting well overall  Broke her right foot this summer vacuuming her car and hit car door with foot  The following have been reviewed and updated as appropriate in this visit:  Allergies  Meds  Problems       Review of Systems   Constitutional: Positive for fatigue. Negative for activity change and appetite change.   Respiratory: Negative for cough, shortness of breath and wheezing.    Cardiovascular: Negative for chest pain, palpitations and leg swelling.   Gastrointestinal: Negative for abdominal distention, abdominal pain, constipation, diarrhea, nausea and vomiting.   Endocrine: Negative for cold intolerance and heat intolerance.   Genitourinary: Negative for difficulty urinating and frequency.   Skin: Negative for rash.   Neurological: Negative for dizziness, weakness, light-headedness and numbness.   Psychiatric/Behavioral: Negative for dysphoric mood and sleep disturbance. The patient is not nervous/anxious.        Objective     Vitals:    10/01/19 1135   BP: 100/60   BP Location: Left upper arm   Patient Position: Sitting   Pulse: 68   Resp: 16   Temp: 36.7 °C (98.1 °F)   TempSrc: Oral   SpO2: 98%   Weight: 55.8 kg (123 lb)     Body mass index is 20.31 kg/m².    Physical Exam   Constitutional: She is oriented to person, place, and time. She appears well-developed and well-nourished.   Cardiovascular: Normal rate and regular rhythm.    No murmur heard.  Pulmonary/Chest: Effort normal. She has no wheezes.   Abdominal: Soft. There is no tenderness.   Neurological: She is alert and oriented to person, place, and time.   Psychiatric: She has a normal mood and affect.   Nursing note and vitals reviewed.      Assessment/Plan    Diagnoses and all orders for this visit:    Shift work sleep disorder (Primary)  Assessment & Plan:  Stable on Nuvigil      Will get flu shot at work  Return in about 6 months (around 4/1/2020).  PVU

## 2019-10-23 DIAGNOSIS — G47.26 SHIFT WORK SLEEP DISORDER: ICD-10-CM

## 2019-10-23 RX ORDER — ARMODAFINIL 150 MG/1
TABLET ORAL
Qty: 30 TABLET | Refills: 0 | Status: SHIPPED | OUTPATIENT
Start: 2019-10-23 | End: 2019-12-03 | Stop reason: SDUPTHER

## 2019-10-23 NOTE — TELEPHONE ENCOUNTER
Medicine Refill Request    [facility] rx last filled 9/12/19    Last Office Visit: 10/1/2019  Next Office Visit: Visit date not found        Current Outpatient Medications:   •  armodafinil (NUVIGIL) 150 mg tablet, Take 1 tablet PO as needed for night shift work, Disp: 30 tablet, Rfl: 0  •  hydrocortisone (ANUSOL-HC) 25 mg suppository, Insert 1 suppository (25 mg total) into the rectum 2 (two) times a day for 10 days., Disp: 20 suppository, Rfl: 0  •  multivitamin (THERAGRAN) tablet, take 1 tablet by oral route  every day, Disp: , Rfl:   •  tretinoin (RETIN-A) 0.05 % cream, 0.05 %., Disp: , Rfl:       BP Readings from Last 3 Encounters:   10/01/19 100/60   05/24/19 110/64   02/25/19 90/60       Recent Lab results:  No results found for: CHOL, No results found for: HDL, No results found for: LDLCALC, No results found for: TRIG     No results found for: GLUCOSE, No results found for: HGBA1C      No results found for: CREATININE    Lab Results   Component Value Date    TSH 0.793 08/02/2018

## 2019-12-03 DIAGNOSIS — G47.26 SHIFT WORK SLEEP DISORDER: ICD-10-CM

## 2019-12-03 RX ORDER — ARMODAFINIL 150 MG/1
TABLET ORAL
Qty: 30 TABLET | Refills: 0 | Status: SHIPPED | OUTPATIENT
Start: 2019-12-03 | End: 2020-01-10 | Stop reason: SDUPTHER

## 2019-12-03 NOTE — TELEPHONE ENCOUNTER
Medicine Refill Request    Last Office Visit: 10/1/2019  Next Office Visit: Visit date not found        Current Outpatient Medications:   •  armodafinil (NUVIGIL) 150 mg tablet, Take 1 tablet PO as needed for night shift work, Disp: 30 tablet, Rfl: 0  •  hydrocortisone (ANUSOL-HC) 25 mg suppository, Insert 1 suppository (25 mg total) into the rectum 2 (two) times a day for 10 days., Disp: 20 suppository, Rfl: 0  •  multivitamin (THERAGRAN) tablet, take 1 tablet by oral route  every day, Disp: , Rfl:   •  tretinoin (RETIN-A) 0.05 % cream, 0.05 %., Disp: , Rfl:       BP Readings from Last 3 Encounters:   10/01/19 100/60   05/24/19 110/64   02/25/19 90/60       Recent Lab results:  No results found for: CHOL, No results found for: HDL, No results found for: LDLCALC, No results found for: TRIG     No results found for: GLUCOSE, No results found for: HGBA1C      No results found for: CREATININE    Lab Results   Component Value Date    TSH 0.793 08/02/2018

## 2020-01-10 DIAGNOSIS — G47.26 SHIFT WORK SLEEP DISORDER: ICD-10-CM

## 2020-01-10 RX ORDER — ARMODAFINIL 150 MG/1
TABLET ORAL
Qty: 30 TABLET | Refills: 0 | Status: SHIPPED | OUTPATIENT
Start: 2020-01-10 | End: 2020-02-21 | Stop reason: SDUPTHER

## 2020-01-13 DIAGNOSIS — N92.6 IRREGULAR MENSES: ICD-10-CM

## 2020-01-16 RX ORDER — LEVONORGESTREL AND ETHINYL ESTRADIOL 0.15-0.03
1 KIT ORAL DAILY
Qty: 28 TABLET | Refills: 12 | Status: SHIPPED | OUTPATIENT
Start: 2020-01-16 | End: 2020-11-10

## 2020-02-21 DIAGNOSIS — G47.26 SHIFT WORK SLEEP DISORDER: ICD-10-CM

## 2020-02-21 RX ORDER — NORELGESTROMIN AND ETHINYL ESTRADIOL 35; 150 UG/MG; UG/MG
PATCH TRANSDERMAL
Qty: 3 PATCH | Refills: 12 | Status: SHIPPED | OUTPATIENT
Start: 2020-02-21 | End: 2020-06-04 | Stop reason: SDUPTHER

## 2020-02-21 RX ORDER — ARMODAFINIL 150 MG/1
TABLET ORAL
Qty: 30 TABLET | Refills: 0 | Status: SHIPPED | OUTPATIENT
Start: 2020-02-21 | End: 2020-04-07 | Stop reason: SDUPTHER

## 2020-02-24 DIAGNOSIS — G47.26 SHIFT WORK SLEEP DISORDER: ICD-10-CM

## 2020-02-24 RX ORDER — ARMODAFINIL 150 MG/1
TABLET ORAL
Qty: 30 TABLET | Refills: 0 | OUTPATIENT
Start: 2020-02-24

## 2020-02-24 NOTE — TELEPHONE ENCOUNTER
Medicine Refill Request    Query rx last filled 2/21/20    Last Office Visit: 10/1/2019  Next Office Visit: Visit date not found    Appt due 04/2020    Current Outpatient Medications:   •  armodafiniL (NUVIGIL) 150 mg tablet, Take 1 tablet PO as needed for night shift work, Disp: 30 tablet, Rfl: 0  •  hydrocortisone (ANUSOL-HC) 25 mg suppository, Insert 1 suppository (25 mg total) into the rectum 2 (two) times a day for 10 days., Disp: 20 suppository, Rfl: 0  •  levonorgestrel-ethinyl estrad (NORDETTE) 0.15-0.03 mg per tablet, Take 1 tablet by mouth daily., Disp: 28 tablet, Rfl: 12  •  multivitamin (THERAGRAN) tablet, take 1 tablet by oral route  every day, Disp: , Rfl:   •  norelgestromin-ethin.estradioL (XULANE) 150-35 mcg/24 hr, Apply 1 patch each week for 3 weeks, then remove for 1 week., Disp: 3 patch, Rfl: 12  •  tretinoin (RETIN-A) 0.05 % cream, 0.05 %., Disp: , Rfl:       BP Readings from Last 3 Encounters:   10/01/19 100/60   05/24/19 110/64   02/25/19 90/60       Recent Lab results:  No results found for: CHOL, No results found for: HDL, No results found for: LDLCALC, No results found for: TRIG     No results found for: GLUCOSE, No results found for: HGBA1C      No results found for: CREATININE    Lab Results   Component Value Date    TSH 0.793 08/02/2018

## 2020-03-16 DIAGNOSIS — G47.26 SHIFT WORK SLEEP DISORDER: ICD-10-CM

## 2020-03-16 RX ORDER — ARMODAFINIL 150 MG/1
TABLET ORAL
Qty: 30 TABLET | Refills: 0 | OUTPATIENT
Start: 2020-03-16

## 2020-03-16 NOTE — TELEPHONE ENCOUNTER
Medicine Refill Request    Query rx last filled 2/21/20    Last Office Visit: 10/1/2019  Next Office Visit: Visit date not found        Current Outpatient Medications:   •  armodafiniL (NUVIGIL) 150 mg tablet, Take 1 tablet PO as needed for night shift work, Disp: 30 tablet, Rfl: 0  •  hydrocortisone (ANUSOL-HC) 25 mg suppository, Insert 1 suppository (25 mg total) into the rectum 2 (two) times a day for 10 days., Disp: 20 suppository, Rfl: 0  •  levonorgestrel-ethinyl estrad (NORDETTE) 0.15-0.03 mg per tablet, Take 1 tablet by mouth daily., Disp: 28 tablet, Rfl: 12  •  multivitamin (THERAGRAN) tablet, take 1 tablet by oral route  every day, Disp: , Rfl:   •  norelgestromin-ethin.estradioL (XULANE) 150-35 mcg/24 hr, Apply 1 patch each week for 3 weeks, then remove for 1 week., Disp: 3 patch, Rfl: 12  •  tretinoin (RETIN-A) 0.05 % cream, 0.05 %., Disp: , Rfl:       BP Readings from Last 3 Encounters:   10/01/19 100/60   05/24/19 110/64   02/25/19 90/60       Recent Lab results:  No results found for: CHOL, No results found for: HDL, No results found for: LDLCALC, No results found for: TRIG     No results found for: GLUCOSE, No results found for: HGBA1C      No results found for: CREATININE    Lab Results   Component Value Date    TSH 0.793 08/02/2018

## 2020-04-07 DIAGNOSIS — G47.26 SHIFT WORK SLEEP DISORDER: ICD-10-CM

## 2020-04-07 RX ORDER — ARMODAFINIL 150 MG/1
TABLET ORAL
Qty: 30 TABLET | Refills: 0 | Status: SHIPPED | OUTPATIENT
Start: 2020-04-07 | End: 2020-06-04 | Stop reason: SDUPTHER

## 2020-04-07 NOTE — TELEPHONE ENCOUNTER
Medicine Refill Request  Query rx  Last Office Visit: 10/1/2019  Next Office Visit: Visit date not found        Current Outpatient Medications:   •  armodafiniL (NUVIGIL) 150 mg tablet, Take 1 tablet PO as needed for night shift work, Disp: 30 tablet, Rfl: 0  •  hydrocortisone (ANUSOL-HC) 25 mg suppository, Insert 1 suppository (25 mg total) into the rectum 2 (two) times a day for 10 days., Disp: 20 suppository, Rfl: 0  •  levonorgestrel-ethinyl estrad (NORDETTE) 0.15-0.03 mg per tablet, Take 1 tablet by mouth daily., Disp: 28 tablet, Rfl: 12  •  multivitamin (THERAGRAN) tablet, take 1 tablet by oral route  every day, Disp: , Rfl:   •  norelgestromin-ethin.estradioL (XULANE) 150-35 mcg/24 hr, Apply 1 patch each week for 3 weeks, then remove for 1 week., Disp: 3 patch, Rfl: 12  •  tretinoin (RETIN-A) 0.05 % cream, 0.05 %., Disp: , Rfl:       BP Readings from Last 3 Encounters:   10/01/19 100/60   05/24/19 110/64   02/25/19 90/60       Recent Lab results:  No results found for: CHOL, No results found for: HDL, No results found for: LDLCALC, No results found for: TRIG     No results found for: GLUCOSE, No results found for: HGBA1C      No results found for: CREATININE    Lab Results   Component Value Date    TSH 0.793 08/02/2018

## 2020-04-08 ENCOUNTER — TELEPHONE (OUTPATIENT)
Dept: FAMILY MEDICINE | Facility: CLINIC | Age: 35
End: 2020-04-08

## 2020-06-04 DIAGNOSIS — G47.26 SHIFT WORK SLEEP DISORDER: ICD-10-CM

## 2020-06-04 RX ORDER — NORELGESTROMIN AND ETHINYL ESTRADIOL 35; 150 UG/MG; UG/MG
PATCH TRANSDERMAL
Qty: 3 PATCH | Refills: 12 | Status: SHIPPED | OUTPATIENT
Start: 2020-06-04 | End: 2021-03-16 | Stop reason: SDUPTHER

## 2020-06-04 RX ORDER — ARMODAFINIL 150 MG/1
TABLET ORAL
Qty: 30 TABLET | Refills: 0 | Status: SHIPPED | OUTPATIENT
Start: 2020-06-04 | End: 2020-06-05 | Stop reason: SDUPTHER

## 2020-06-05 ENCOUNTER — TELEPHONE (OUTPATIENT)
Dept: FAMILY MEDICINE | Facility: CLINIC | Age: 35
End: 2020-06-05

## 2020-06-05 DIAGNOSIS — G47.26 SHIFT WORK SLEEP DISORDER: ICD-10-CM

## 2020-06-05 RX ORDER — ARMODAFINIL 150 MG/1
150 TABLET ORAL EVERY MORNING
Qty: 30 TABLET | Refills: 0 | Status: SHIPPED | OUTPATIENT
Start: 2020-06-05 | End: 2020-07-09

## 2020-06-05 NOTE — TELEPHONE ENCOUNTER
TC from pharm RE: Nuvigil Rx. They need Rx updated with how many times daily she is taking and at what time of day.

## 2020-07-09 DIAGNOSIS — G47.26 SHIFT WORK SLEEP DISORDER: ICD-10-CM

## 2020-07-09 RX ORDER — ARMODAFINIL 150 MG/1
TABLET ORAL
Qty: 30 TABLET | Refills: 0 | Status: SHIPPED | OUTPATIENT
Start: 2020-07-09 | End: 2020-08-10

## 2020-07-09 NOTE — TELEPHONE ENCOUNTER
Medicine Refill Request  Query rx  Last Office Visit: 10/1/2019  Last Telemedicine Visit: Visit date not found    Next Office Visit: Visit date not found  Next Telemedicine Visit: Visit date not found         Current Outpatient Medications:   •  armodafiniL (NUVIGIL) 150 mg tablet, Take 1 tablet (150 mg total) by mouth every morning., Disp: 30 tablet, Rfl: 0  •  hydrocortisone (ANUSOL-HC) 25 mg suppository, Insert 1 suppository (25 mg total) into the rectum 2 (two) times a day for 10 days., Disp: 20 suppository, Rfl: 0  •  levonorgestrel-ethinyl estrad (NORDETTE) 0.15-0.03 mg per tablet, Take 1 tablet by mouth daily., Disp: 28 tablet, Rfl: 12  •  multivitamin (THERAGRAN) tablet, take 1 tablet by oral route  every day, Disp: , Rfl:   •  norelgestromin-ethin.estradioL (XULANE) 150-35 mcg/24 hr, Apply 1 patch each week for 3 weeks, then remove for 1 week., Disp: 3 patch, Rfl: 12  •  tretinoin (RETIN-A) 0.05 % cream, 0.05 %., Disp: , Rfl:       BP Readings from Last 3 Encounters:   10/01/19 100/60   05/24/19 110/64   02/25/19 90/60       Recent Lab results:  No results found for: CHOL, No results found for: HDL, No results found for: LDLCALC, No results found for: TRIG     No results found for: GLUCOSE, No results found for: HGBA1C      No results found for: CREATININE    Lab Results   Component Value Date    TSH 0.793 08/02/2018

## 2020-08-10 DIAGNOSIS — G47.26 SHIFT WORK SLEEP DISORDER: ICD-10-CM

## 2020-08-10 RX ORDER — ARMODAFINIL 150 MG/1
TABLET ORAL
Qty: 30 TABLET | Refills: 0 | Status: SHIPPED | OUTPATIENT
Start: 2020-08-10 | End: 2020-09-15

## 2020-09-15 DIAGNOSIS — G47.26 SHIFT WORK SLEEP DISORDER: ICD-10-CM

## 2020-09-15 RX ORDER — ARMODAFINIL 150 MG/1
TABLET ORAL
Qty: 30 TABLET | Refills: 0 | Status: SHIPPED | OUTPATIENT
Start: 2020-09-15 | End: 2020-10-19

## 2020-10-19 DIAGNOSIS — G47.26 SHIFT WORK SLEEP DISORDER: ICD-10-CM

## 2020-10-19 RX ORDER — ARMODAFINIL 150 MG/1
TABLET ORAL
Qty: 30 TABLET | Refills: 0 | Status: SHIPPED | OUTPATIENT
Start: 2020-10-19 | End: 2020-11-20

## 2020-11-10 ENCOUNTER — OFFICE VISIT (OUTPATIENT)
Dept: FAMILY MEDICINE | Facility: CLINIC | Age: 35
End: 2020-11-10
Payer: COMMERCIAL

## 2020-11-10 VITALS
RESPIRATION RATE: 16 BRPM | HEART RATE: 73 BPM | HEIGHT: 65 IN | SYSTOLIC BLOOD PRESSURE: 110 MMHG | WEIGHT: 126 LBS | TEMPERATURE: 98.3 F | OXYGEN SATURATION: 98 % | BODY MASS INDEX: 20.99 KG/M2 | DIASTOLIC BLOOD PRESSURE: 80 MMHG

## 2020-11-10 DIAGNOSIS — Z00.00 PHYSICAL EXAM: Primary | ICD-10-CM

## 2020-11-10 DIAGNOSIS — G47.26 SHIFT WORK SLEEP DISORDER: ICD-10-CM

## 2020-11-10 DIAGNOSIS — R59.1 LYMPHADENOPATHY: ICD-10-CM

## 2020-11-10 DIAGNOSIS — Z11.3 SCREENING FOR STD (SEXUALLY TRANSMITTED DISEASE): ICD-10-CM

## 2020-11-10 DIAGNOSIS — Z12.4 CERVICAL CANCER SCREENING: ICD-10-CM

## 2020-11-10 PROCEDURE — 99395 PREV VISIT EST AGE 18-39: CPT | Performed by: FAMILY MEDICINE

## 2020-11-10 ASSESSMENT — ENCOUNTER SYMPTOMS
WHEEZING: 0
CHEST TIGHTNESS: 0
PALPITATIONS: 0
DIARRHEA: 0
APPETITE CHANGE: 0
RHINORRHEA: 0
APNEA: 0
COUGH: 0
BACK PAIN: 0
EYE PAIN: 0
NERVOUS/ANXIOUS: 0
SINUS PAIN: 0
NAUSEA: 0
DYSPHORIC MOOD: 0
PHOTOPHOBIA: 0
ABDOMINAL DISTENTION: 0
SORE THROAT: 0
SLEEP DISTURBANCE: 0
HEADACHES: 0
VOMITING: 0
NUMBNESS: 0
EYE DISCHARGE: 0
LIGHT-HEADEDNESS: 0
VOICE CHANGE: 0
WEAKNESS: 0
TROUBLE SWALLOWING: 0
DIFFICULTY URINATING: 0
DIZZINESS: 0
JOINT SWELLING: 0
ACTIVITY CHANGE: 0
EYE REDNESS: 0
BLOOD IN STOOL: 0
ABDOMINAL PAIN: 0
UNEXPECTED WEIGHT CHANGE: 0
HEMATURIA: 0
DYSURIA: 0
CONSTIPATION: 0
NECK PAIN: 0
FREQUENCY: 0
FATIGUE: 0
SHORTNESS OF BREATH: 0

## 2020-11-10 NOTE — PROGRESS NOTES
Subjective      Patient ID: Alliana K Kalisch is a 35 y.o. female.  1985      HPI  Pt presents for physical exam  3 p to 3 a and 7p and 7a alternating shifts still takes provigil as needed which helps  States sleep is adequate  Just ended relationship    Has new partner for about month  Has been using condoms  Has tender nodes on right side of neck  The following have been reviewed and updated as appropriate in this visit:  Tobacco  Allergies  Meds  Problems  Med Hx  Surg Hx  Fam Hx  Soc Hx        Review of Systems   Constitutional: Negative for activity change, appetite change, fatigue and unexpected weight change.   HENT: Negative for congestion, ear pain, hearing loss, rhinorrhea, sinus pain, sneezing, sore throat, trouble swallowing and voice change.    Eyes: Negative for photophobia, pain, discharge, redness and visual disturbance.   Respiratory: Negative for apnea, cough, chest tightness, shortness of breath and wheezing.    Cardiovascular: Negative for chest pain, palpitations and leg swelling.   Gastrointestinal: Negative for abdominal distention, abdominal pain, blood in stool, constipation, diarrhea, nausea and vomiting.   Endocrine: Negative for cold intolerance and heat intolerance.   Genitourinary: Positive for menstrual problem (bleeding in between). Negative for decreased urine volume, difficulty urinating, dysuria, frequency, hematuria, urgency, vaginal bleeding, vaginal discharge and vaginal pain.   Musculoskeletal: Negative for back pain, joint swelling and neck pain.   Skin: Negative for rash.   Neurological: Negative for dizziness, weakness, light-headedness, numbness and headaches.   Hematological: Adenopathy: neck on right.   Psychiatric/Behavioral: Negative for dysphoric mood, self-injury, sleep disturbance and suicidal ideas. The patient is not nervous/anxious.        Objective     Vitals:    11/10/20 0933   BP: 110/80   Pulse: 73   Resp: 16   Temp: 36.8 °C (98.3 °F)  "  TempSrc: Oral   SpO2: 98%   Weight: 57.2 kg (126 lb)   Height: 1.651 m (5' 5\")     Body mass index is 20.97 kg/m².    Physical Exam  Vitals signs and nursing note reviewed.   Constitutional:       Appearance: She is well-developed.   HENT:      Head: Normocephalic.      Right Ear: Tympanic membrane, ear canal and external ear normal.      Left Ear: Tympanic membrane, ear canal and external ear normal.      Nose: Nose normal.   Eyes:      Conjunctiva/sclera: Conjunctivae normal.      Pupils: Pupils are equal, round, and reactive to light.   Neck:      Vascular: No JVD.   Cardiovascular:      Rate and Rhythm: Normal rate and regular rhythm.      Heart sounds: No murmur.   Pulmonary:      Effort: Pulmonary effort is normal. No respiratory distress.      Breath sounds: Normal breath sounds. No wheezing.   Chest:      Breasts:         Right: Normal.         Left: Normal.   Abdominal:      General: Bowel sounds are normal. There is no distension.      Palpations: Abdomen is soft. There is no mass.      Tenderness: There is no abdominal tenderness. There is no guarding or rebound.      Hernia: No hernia is present.   Genitourinary:     Labia:         Right: No rash.         Left: No rash.       Vagina: Vaginal discharge (blood) present.      Cervix: Normal.      Uterus: Normal.       Adnexa: Right adnexa normal and left adnexa normal.   Musculoskeletal:         General: No tenderness.   Lymphadenopathy:      Cervical: Cervical adenopathy (posterior chain and submandibular on right) present.   Skin:     General: Skin is warm and dry.   Neurological:      Mental Status: She is alert and oriented to person, place, and time.      Sensory: No sensory deficit.      Deep Tendon Reflexes: Reflexes normal.         Assessment/Plan   Diagnoses and all orders for this visit:    Physical exam (Primary)  -     Lipid panel; Future  -     TSH w reflex FT4; Future  -     Comprehensive metabolic panel; Future  -     CBC and Differential; " Future    Screening for STD (sexually transmitted disease)  Comments:  std screen    Orders:  -     HIV 1,2 AB P24 AG; Future  -     HSV Type 2-Specific Ab, IgG; Future  -     RPR; Future    Shift work sleep disorder  Assessment & Plan:  Continue Provigil      Cervical cancer screening  Comments:  pap done    Lymphadenopathy  Comments:  check US  Orders:  -     ULTRASOUND NECK; Future    Refer to Gyn to discuss IUD    Current Outpatient Medications:   •  armodafiniL (NUVIGIL) 150 mg tablet, TAKE 1 TABLET(150 MG) BY MOUTH EVERY MORNING, Disp: 30 tablet, Rfl: 0  •  multivitamin (THERAGRAN) tablet, take 1 tablet by oral route  every day, Disp: , Rfl:   •  norelgestromin-ethin.estradioL (XULANE) 150-35 mcg/24 hr, Apply 1 patch each week for 3 weeks, then remove for 1 week., Disp: 3 patch, Rfl: 12  •  tretinoin (RETIN-A) 0.05 % cream, 0.05 %., Disp: , Rfl:   •  hydrocortisone (ANUSOL-HC) 25 mg suppository, Insert 1 suppository (25 mg total) into the rectum 2 (two) times a day for 10 days., Disp: 20 suppository, Rfl: 0  Return in about 1 year (around 11/10/2021) for PE.  Patient  agreeable with plan and verbalized understanding

## 2020-11-11 LAB
ALBUMIN SERPL-MCNC: 4.4 G/DL (ref 3.8–4.8)
ALBUMIN/GLOB SERPL: 1.8 {RATIO} (ref 1.2–2.2)
ALP SERPL-CCNC: 68 IU/L (ref 39–117)
ALT SERPL-CCNC: 18 IU/L (ref 0–32)
AST SERPL-CCNC: 24 IU/L (ref 0–40)
BASOPHILS # BLD AUTO: 0 X10E3/UL (ref 0–0.2)
BASOPHILS NFR BLD AUTO: 1 %
BILIRUB SERPL-MCNC: 0.5 MG/DL (ref 0–1.2)
BUN SERPL-MCNC: 14 MG/DL (ref 6–20)
BUN/CREAT SERPL: 18 (ref 9–23)
CALCIUM SERPL-MCNC: 9 MG/DL (ref 8.7–10.2)
CHLORIDE SERPL-SCNC: 99 MMOL/L (ref 96–106)
CHOLEST SERPL-MCNC: 155 MG/DL (ref 100–199)
CO2 SERPL-SCNC: 24 MMOL/L (ref 20–29)
CREAT SERPL-MCNC: 0.8 MG/DL (ref 0.57–1)
EOSINOPHIL # BLD AUTO: 0.1 X10E3/UL (ref 0–0.4)
EOSINOPHIL NFR BLD AUTO: 3 %
ERYTHROCYTE [DISTWIDTH] IN BLOOD BY AUTOMATED COUNT: 12.7 % (ref 11.7–15.4)
GLOBULIN SER CALC-MCNC: 2.5 G/DL (ref 1.5–4.5)
GLUCOSE SERPL-MCNC: 98 MG/DL (ref 65–99)
HCT VFR BLD AUTO: 41 % (ref 34–46.6)
HDLC SERPL-MCNC: 70 MG/DL
HGB BLD-MCNC: 13.8 G/DL (ref 11.1–15.9)
HIV 1+2 AB+HIV1 P24 AG SERPL QL IA: NON REACTIVE
HSV2 IGG SER IA-ACNC: <0.91 INDEX (ref 0–0.9)
IMM GRANULOCYTES # BLD AUTO: 0 X10E3/UL (ref 0–0.1)
IMM GRANULOCYTES NFR BLD AUTO: 0 %
LAB CORP EGFR IF AFRICN AM: 109 ML/MIN/1.73
LAB CORP EGFR IF NONAFRICN AM: 94 ML/MIN/1.73
LDLC SERPL CALC-MCNC: 70 MG/DL (ref 0–99)
LYMPHOCYTES # BLD AUTO: 1.6 X10E3/UL (ref 0.7–3.1)
LYMPHOCYTES NFR BLD AUTO: 35 %
MCH RBC QN AUTO: 30.9 PG (ref 26.6–33)
MCHC RBC AUTO-ENTMCNC: 33.7 G/DL (ref 31.5–35.7)
MCV RBC AUTO: 92 FL (ref 79–97)
MONOCYTES # BLD AUTO: 0.6 X10E3/UL (ref 0.1–0.9)
MONOCYTES NFR BLD AUTO: 12 %
NEUTROPHILS # BLD AUTO: 2.3 X10E3/UL (ref 1.4–7)
NEUTROPHILS NFR BLD AUTO: 49 %
PLATELET # BLD AUTO: 246 X10E3/UL (ref 150–450)
POTASSIUM SERPL-SCNC: 4.2 MMOL/L (ref 3.5–5.2)
PROT SERPL-MCNC: 6.9 G/DL (ref 6–8.5)
RBC # BLD AUTO: 4.47 X10E6/UL (ref 3.77–5.28)
RPR SER QL: NON REACTIVE
SODIUM SERPL-SCNC: 136 MMOL/L (ref 134–144)
T4 FREE SERPL-MCNC: 1.1 NG/DL (ref 0.82–1.77)
TRIGL SERPL-MCNC: 81 MG/DL (ref 0–149)
TSH SERPL DL<=0.005 MIU/L-ACNC: 1.43 UIU/ML (ref 0.45–4.5)
VLDLC SERPL CALC-MCNC: 15 MG/DL (ref 5–40)
WBC # BLD AUTO: 4.7 X10E3/UL (ref 3.4–10.8)

## 2020-11-14 LAB
C TRACH RRNA CVX QL NAA+PROBE: NEGATIVE
CYTOLOGIST CVX/VAG CYTO: NORMAL
CYTOLOGY CVX/VAG DOC CYTO: NORMAL
CYTOLOGY CVX/VAG DOC THIN PREP: NORMAL
DX ICD CODE: NORMAL
HPV I/H RISK 4 DNA CVX QL PROBE+SIG AMP: NEGATIVE
LAB CORP NOTE:: NORMAL
N GONORRHOEA RRNA CVX QL NAA+PROBE: NEGATIVE
OTHER STN SPEC: NORMAL
STAT OF ADQ CVX/VAG CYTO-IMP: NORMAL
T VAGINALIS RRNA SPEC QL NAA+PROBE: NEGATIVE

## 2020-12-30 DIAGNOSIS — G47.26 SHIFT WORK SLEEP DISORDER: ICD-10-CM

## 2020-12-30 RX ORDER — ARMODAFINIL 150 MG/1
TABLET ORAL
Qty: 30 TABLET | Refills: 0 | Status: SHIPPED | OUTPATIENT
Start: 2020-12-30 | End: 2021-01-29

## 2020-12-30 NOTE — TELEPHONE ENCOUNTER
Medicine Refill Request    Query RX nuvigil     Last Office Visit: 11/10/2020  Last Telemedicine Visit: Visit date not found    Next Office Visit: Visit date not found  Next Telemedicine Visit: Visit date not found         Current Outpatient Medications:   •  armodafiniL (NUVIGIL) 150 mg tablet, TAKE 1 TABLET(150 MG) BY MOUTH EVERY MORNING, Disp: 30 tablet, Rfl: 0  •  hydrocortisone (ANUSOL-HC) 25 mg suppository, Insert 1 suppository (25 mg total) into the rectum 2 (two) times a day for 10 days., Disp: 20 suppository, Rfl: 0  •  multivitamin (THERAGRAN) tablet, take 1 tablet by oral route  every day, Disp: , Rfl:   •  norelgestromin-ethin.estradioL (XULANE) 150-35 mcg/24 hr, Apply 1 patch each week for 3 weeks, then remove for 1 week., Disp: 3 patch, Rfl: 12  •  tretinoin (RETIN-A) 0.05 % cream, 0.05 %., Disp: , Rfl:       BP Readings from Last 3 Encounters:   11/10/20 110/80   10/01/19 100/60   05/24/19 110/64       Recent Lab results:  Lab Results   Component Value Date    CHOL 155 11/10/2020   ,   Lab Results   Component Value Date    HDL 70 11/10/2020   ,   Lab Results   Component Value Date    LDLCALC 70 11/10/2020   ,   Lab Results   Component Value Date    TRIG 81 11/10/2020        Lab Results   Component Value Date    GLUCOSE 98 11/10/2020   , No results found for: HGBA1C      Lab Results   Component Value Date    CREATININE 0.80 11/10/2020       Lab Results   Component Value Date    TSH 1.430 11/10/2020

## 2021-01-29 DIAGNOSIS — G47.26 SHIFT WORK SLEEP DISORDER: ICD-10-CM

## 2021-01-29 RX ORDER — ARMODAFINIL 150 MG/1
TABLET ORAL
Qty: 30 TABLET | Refills: 0 | Status: SHIPPED | OUTPATIENT
Start: 2021-01-29 | End: 2021-03-02

## 2021-03-02 DIAGNOSIS — G47.26 SHIFT WORK SLEEP DISORDER: ICD-10-CM

## 2021-03-02 RX ORDER — ARMODAFINIL 150 MG/1
TABLET ORAL
Qty: 30 TABLET | Refills: 0 | Status: SHIPPED | OUTPATIENT
Start: 2021-03-02 | End: 2021-04-01

## 2021-03-02 NOTE — TELEPHONE ENCOUNTER
Medicine Refill Request    Query RX nuvigil     Last Office Visit: 11/10/2020  Last Telemedicine Visit: Visit date not found    Next Office Visit: Visit date not found  Next Telemedicine Visit: Visit date not found         Current Outpatient Medications:   •  armodafiniL (NUVIGIL) 150 mg tablet, TAKE 1 TABLET(150 MG) BY MOUTH EVERY MORNING, Disp: 30 tablet, Rfl: 0  •  multivitamin (THERAGRAN) tablet, take 1 tablet by oral route  every day, Disp: , Rfl:   •  norelgestromin-ethin.estradioL (XULANE) 150-35 mcg/24 hr, Apply 1 patch each week for 3 weeks, then remove for 1 week., Disp: 3 patch, Rfl: 12  •  tretinoin (RETIN-A) 0.05 % cream, 0.05 %., Disp: , Rfl:       BP Readings from Last 3 Encounters:   11/10/20 110/80   10/01/19 100/60   05/24/19 110/64       Recent Lab results:  Lab Results   Component Value Date    CHOL 155 11/10/2020   ,   Lab Results   Component Value Date    HDL 70 11/10/2020   ,   Lab Results   Component Value Date    LDLCALC 70 11/10/2020   ,   Lab Results   Component Value Date    TRIG 81 11/10/2020        Lab Results   Component Value Date    GLUCOSE 98 11/10/2020   , No results found for: HGBA1C      Lab Results   Component Value Date    CREATININE 0.80 11/10/2020       Lab Results   Component Value Date    TSH 1.430 11/10/2020

## 2021-03-16 RX ORDER — NORELGESTROMIN AND ETHINYL ESTRADIOL 150; 35 UG/D; UG/D
PATCH TRANSDERMAL
Qty: 3 PATCH | Refills: 12 | Status: SHIPPED | OUTPATIENT
Start: 2021-03-16 | End: 2021-05-19

## 2021-03-16 RX ORDER — NORELGESTROMIN AND ETHINYL ESTRADIOL 35; 150 UG/MG; UG/MG
PATCH TRANSDERMAL
COMMUNITY
Start: 2020-06-04 | End: 2021-08-17

## 2021-03-16 NOTE — TELEPHONE ENCOUNTER
Medicine Refill Request    Query RX xulane     Last Office Visit: 11/10/2020  Last Telemedicine Visit: Visit date not found    Next Office Visit: Visit date not found  Next Telemedicine Visit: Visit date not found         Current Outpatient Medications:   •  norelgestromin-ethin.estradioL (ORTHO EVRA) 150-35 mcg/24 hr, Apply 1 patch each week for 3 weeks, then remove for 1 week., Disp: , Rfl:   •  armodafiniL (NUVIGIL) 150 mg tablet, TAKE 1 TABLET(150 MG) BY MOUTH EVERY MORNING, Disp: 30 tablet, Rfl: 0  •  multivitamin (THERAGRAN) tablet, take 1 tablet by oral route  every day, Disp: , Rfl:   •  norelgestromin-ethin.estradioL (XULANE) 150-35 mcg/24 hr, Apply 1 patch each week for 3 weeks, then remove for 1 week., Disp: 3 patch, Rfl: 12  •  tretinoin (RETIN-A) 0.05 % cream, 0.05 %., Disp: , Rfl:       BP Readings from Last 3 Encounters:   11/10/20 110/80   10/01/19 100/60   05/24/19 110/64       Recent Lab results:  Lab Results   Component Value Date    CHOL 155 11/10/2020   ,   Lab Results   Component Value Date    HDL 70 11/10/2020   ,   Lab Results   Component Value Date    LDLCALC 70 11/10/2020   ,   Lab Results   Component Value Date    TRIG 81 11/10/2020        Lab Results   Component Value Date    GLUCOSE 98 11/10/2020   , No results found for: HGBA1C      Lab Results   Component Value Date    CREATININE 0.80 11/10/2020       Lab Results   Component Value Date    TSH 1.430 11/10/2020

## 2021-04-01 DIAGNOSIS — G47.26 SHIFT WORK SLEEP DISORDER: ICD-10-CM

## 2021-04-01 RX ORDER — ARMODAFINIL 150 MG/1
TABLET ORAL
Qty: 30 TABLET | Refills: 0 | Status: SHIPPED | OUTPATIENT
Start: 2021-04-01 | End: 2021-05-05

## 2021-05-05 DIAGNOSIS — G47.26 SHIFT WORK SLEEP DISORDER: ICD-10-CM

## 2021-05-05 RX ORDER — ARMODAFINIL 150 MG/1
TABLET ORAL
Qty: 30 TABLET | Refills: 0 | Status: SHIPPED | OUTPATIENT
Start: 2021-05-05 | End: 2021-06-08

## 2021-05-13 ENCOUNTER — TELEPHONE (OUTPATIENT)
Dept: FAMILY MEDICINE | Facility: CLINIC | Age: 36
End: 2021-05-13

## 2021-05-19 ENCOUNTER — OFFICE VISIT (OUTPATIENT)
Dept: FAMILY MEDICINE | Facility: CLINIC | Age: 36
End: 2021-05-19
Payer: COMMERCIAL

## 2021-05-19 VITALS
TEMPERATURE: 98.3 F | OXYGEN SATURATION: 98 % | DIASTOLIC BLOOD PRESSURE: 60 MMHG | HEART RATE: 94 BPM | WEIGHT: 130 LBS | RESPIRATION RATE: 16 BRPM | BODY MASS INDEX: 21.66 KG/M2 | HEIGHT: 65 IN | SYSTOLIC BLOOD PRESSURE: 90 MMHG

## 2021-05-19 DIAGNOSIS — G47.26 SHIFT WORK SLEEP DISORDER: Primary | ICD-10-CM

## 2021-05-19 PROBLEM — U07.1 COVID-19: Status: ACTIVE | Noted: 2021-05-19

## 2021-05-19 PROCEDURE — 99213 OFFICE O/P EST LOW 20 MIN: CPT | Performed by: FAMILY MEDICINE

## 2021-05-19 PROCEDURE — 3008F BODY MASS INDEX DOCD: CPT | Performed by: FAMILY MEDICINE

## 2021-05-19 ASSESSMENT — ENCOUNTER SYMPTOMS
FREQUENCY: 0
SLEEP DISTURBANCE: 1
ABDOMINAL DISTENTION: 0
APPETITE CHANGE: 0
FATIGUE: 0
NERVOUS/ANXIOUS: 0
DIZZINESS: 0
SHORTNESS OF BREATH: 0
WHEEZING: 0
ACTIVITY CHANGE: 0
CONSTIPATION: 0
NUMBNESS: 0
VOMITING: 0
NAUSEA: 0
DIARRHEA: 0
PALPITATIONS: 0
WEAKNESS: 0
COUGH: 0
LIGHT-HEADEDNESS: 0
DIFFICULTY URINATING: 0
ABDOMINAL PAIN: 0
DYSPHORIC MOOD: 0

## 2021-05-19 NOTE — PROGRESS NOTES
"      Subjective      Patient ID: Alliana K Kalisch is a 35 y.o. female.  1985      HPI  Working 3 pm to 3 am or 11 am and 11pm  Wakes up early  Taking nuvigil which helps with shift work  The following have been reviewed and updated as appropriate in this visit:  Allergies  Meds  Problems       Review of Systems   Constitutional: Negative for activity change, appetite change and fatigue.   Respiratory: Negative for cough, shortness of breath and wheezing.    Cardiovascular: Negative for chest pain, palpitations and leg swelling.   Gastrointestinal: Negative for abdominal distention, abdominal pain, constipation, diarrhea, nausea and vomiting.   Endocrine: Negative for cold intolerance and heat intolerance.   Genitourinary: Negative for difficulty urinating and frequency.   Skin: Negative for rash.   Neurological: Negative for dizziness, weakness, light-headedness and numbness.   Psychiatric/Behavioral: Positive for sleep disturbance (shift work disorder). Negative for dysphoric mood, self-injury and suicidal ideas. The patient is not nervous/anxious.        Objective     Vitals:    05/19/21 1105   BP: 90/60   BP Location: Left upper arm   Patient Position: Sitting   Pulse: 94   Resp: 16   Temp: 36.8 °C (98.3 °F)   TempSrc: Oral   SpO2: 98%   Weight: 59 kg (130 lb)   Height: 1.651 m (5' 5\")     Body mass index is 21.63 kg/m².    Physical Exam  Vitals and nursing note reviewed.   Constitutional:       Appearance: Normal appearance.   Cardiovascular:      Rate and Rhythm: Normal rate and regular rhythm.      Heart sounds: No murmur heard.     Pulmonary:      Effort: Pulmonary effort is normal.      Breath sounds: No wheezing or rhonchi.   Abdominal:      Palpations: Abdomen is soft.      Tenderness: There is no abdominal tenderness.   Musculoskeletal:      Cervical back: Neck supple.   Neurological:      Mental Status: She is alert and oriented to person, place, and time.   Psychiatric:         Mood and Affect: " Mood normal.         Behavior: Behavior normal.         Assessment/Plan   Diagnoses and all orders for this visit:    Shift work sleep disorder (Primary)  Comments:  continue nuvigil  pt queried  no abnormalities    Return in about 6 months (around 11/19/2021) for PE.  Patient  agreeable with plan and verbalized understanding

## 2021-06-08 DIAGNOSIS — G47.26 SHIFT WORK SLEEP DISORDER: ICD-10-CM

## 2021-06-08 RX ORDER — ARMODAFINIL 150 MG/1
TABLET ORAL
Qty: 30 TABLET | Refills: 0 | Status: SHIPPED | OUTPATIENT
Start: 2021-06-08 | End: 2021-07-12

## 2021-06-15 ENCOUNTER — OFFICE VISIT (OUTPATIENT)
Dept: URGENT CARE | Facility: CLINIC | Age: 36
End: 2021-06-15
Payer: COMMERCIAL

## 2021-06-15 VITALS
DIASTOLIC BLOOD PRESSURE: 68 MMHG | WEIGHT: 130 LBS | HEART RATE: 92 BPM | TEMPERATURE: 98.3 F | OXYGEN SATURATION: 98 % | RESPIRATION RATE: 18 BRPM | BODY MASS INDEX: 21.66 KG/M2 | HEIGHT: 65 IN | SYSTOLIC BLOOD PRESSURE: 114 MMHG

## 2021-06-15 DIAGNOSIS — N39.0 URINARY TRACT INFECTION WITHOUT HEMATURIA, SITE UNSPECIFIED: Primary | ICD-10-CM

## 2021-06-15 DIAGNOSIS — R39.9 UTI SYMPTOMS: ICD-10-CM

## 2021-06-15 LAB
SL AMB  POCT GLUCOSE, UA: ABNORMAL
SL AMB LEUKOCYTE ESTERASE,UA: ABNORMAL
SL AMB POCT BILIRUBIN,UA: ABNORMAL
SL AMB POCT BLOOD,UA: ABNORMAL
SL AMB POCT CLARITY,UA: ABNORMAL
SL AMB POCT COLOR,UA: ABNORMAL
SL AMB POCT KETONES,UA: ABNORMAL
SL AMB POCT NITRITE,UA: ABNORMAL
SL AMB POCT PH,UA: 8.5
SL AMB POCT SPECIFIC GRAVITY,UA: 1.01
SL AMB POCT URINE PROTEIN: 100
SL AMB POCT UROBILINOGEN: 0.2

## 2021-06-15 PROCEDURE — G0382 LEV 3 HOSP TYPE B ED VISIT: HCPCS | Performed by: PHYSICIAN ASSISTANT

## 2021-06-15 PROCEDURE — 81002 URINALYSIS NONAUTO W/O SCOPE: CPT | Performed by: PHYSICIAN ASSISTANT

## 2021-06-15 PROCEDURE — S9083 URGENT CARE CENTER GLOBAL: HCPCS | Performed by: PHYSICIAN ASSISTANT

## 2021-06-15 PROCEDURE — 87077 CULTURE AEROBIC IDENTIFY: CPT | Performed by: PHYSICIAN ASSISTANT

## 2021-06-15 PROCEDURE — 87086 URINE CULTURE/COLONY COUNT: CPT | Performed by: PHYSICIAN ASSISTANT

## 2021-06-15 PROCEDURE — 87186 SC STD MICRODIL/AGAR DIL: CPT | Performed by: PHYSICIAN ASSISTANT

## 2021-06-15 RX ORDER — ARMODAFINIL 150 MG/1
TABLET ORAL
COMMUNITY
Start: 2021-06-08

## 2021-06-15 RX ORDER — SULFAMETHOXAZOLE AND TRIMETHOPRIM 800; 160 MG/1; MG/1
1 TABLET ORAL EVERY 12 HOURS SCHEDULED
Qty: 14 TABLET | Refills: 0 | Status: SHIPPED | OUTPATIENT
Start: 2021-06-15 | End: 2021-06-22

## 2021-06-15 RX ORDER — NITROFURANTOIN 25; 75 MG/1; MG/1
100 CAPSULE ORAL 2 TIMES DAILY
Qty: 10 CAPSULE | Refills: 0 | Status: SHIPPED | OUTPATIENT
Start: 2021-06-15 | End: 2021-06-20

## 2021-06-15 RX ORDER — NORELGESTROMIN AND ETHINLY ESTRADIOL 150; 35 UG/D; UG/D
PATCH TRANSDERMAL
COMMUNITY
Start: 2021-06-01

## 2021-06-15 NOTE — PROGRESS NOTES
[address] AFFILIATED WITH [facility]          NAME: Zenobia Ross is a 38 y o  female  : 1985    MRN: [id number]  DATE: Heydi 15, 2021  TIME: 8:56 AM    Assessment and Plan   Urinary tract infection without hematuria, site unspecified [N39 0]  1  Urinary tract infection without hematuria, site unspecified  sulfamethoxazole-trimethoprim (BACTRIM DS) 800-160 mg per tablet   2  UTI symptoms  POCT urine dip    Urine culture       Patient Instructions   Dysuria   AMBULATORY CARE:   Dysuria  is trouble urinating, or pain, burning, or discomfort when you urinate  Dysuria is usually a symptom of another problem, such as a blockage or urinary tract infection  Common symptoms include the following:   · Fever     · Cloudy, bad smelling urine     · Urge to urinate often but urinating little     · Back, side, or abdominal pain     · Blood in your urine     · Discharge that smells bad     · Itching  Seek care immediately if:   · You have severe back, side, or abdominal pain  · You have fever and shaking chills  · You vomit several times in a row  Contact your healthcare provider if:   · Your symptoms do not go away, even after treatment  · You have questions or concerns about your condition or care  Treatment for dysuria  may include medicines to treat a bacterial infection or help decrease bladder spasms  Manage your dysuria:   · Drink more liquids  Liquids help flush out bacteria that may be causing an infection  Ask your healthcare provider how much liquid to drink each day and which liquids are best for you  · Take sitz baths as directed  Fill a bathtub with 4 to 6 inches of warm water  You may also use a sitz bath pan that fits over a toilet  Sit in the sitz bath for 20 minutes  Do this 2 to 3 times a day, or as directed  The warm water can help decrease pain and swelling     Follow up with your healthcare provider as directed:  Write down your questions so you remember to ask them during your visits  © 2017 [facility] Information is for End User's use only and may not be sold, redistributed or otherwise used for commercial purposes  All illustrations and images included in CareNotes® are the copyrighted property of A D A M , Inc  or Caleb Cantu  The above information is an  only  It is not intended as medical advice for individual conditions or treatments  Talk to your doctor, nurse or pharmacist before following any medical regimen to see if it is safe and effective for you  I have prescribed an antibiotic for the infection  Please take the antibiotic as prescribed and finish the entire prescription  I recommend that the patient takes an over the counter probiotic or eats yogurt with live cultures in it [place]) to keep good bacteria in the gut and help prevent diarrhea  If not improving over the next 3-5 days, follow up with PCP  Offered urology referral, patient declined and will find one in her own hospital network  To present to the ER if symptoms worsen  Chief Complaint     Chief Complaint   Patient presents with    Possible UTI     X 2 days         History of Present Illness   Sissy Dalton presents to the clinic c/o    Urinary Frequency   This is a new problem  The current episode started in the past 7 days  The problem occurs every urination  The problem has been unchanged  The quality of the pain is described as burning  There has been no fever  She is sexually active  Associated symptoms include frequency and urgency  Pertinent negatives include no chills, flank pain, hesitancy, nausea, possible pregnancy or vomiting  She has tried increased fluids for the symptoms  The treatment provided no relief  Review of Systems   Review of Systems   Constitutional: Negative for chills, diaphoresis, fatigue and fever  HENT: Negative for congestion, ear discharge, ear pain and facial swelling      Eyes: Negative for photophobia, pain, discharge, redness, itching and visual disturbance  Respiratory: Negative for apnea, cough, chest tightness, shortness of breath and wheezing  Cardiovascular: Negative for chest pain and palpitations  Gastrointestinal: Negative for abdominal pain, nausea and vomiting  Genitourinary: Positive for dysuria, frequency and urgency  Negative for flank pain and hesitancy  Skin: Negative for color change, rash and wound  Neurological: Negative for dizziness and headaches  Hematological: Negative for adenopathy  Current Medications     Long-Term Medications   Medication Sig Dispense Refill    Armodafinil 150 MG tablet       Zafemy 150-35 MCG/24HR          Current Allergies     Allergies as of 06/15/2021    (No Known Allergies)            The following portions of the patient's history were reviewed and updated as appropriate: allergies, current medications, past family history, past medical history, past social history, past surgical history and problem list   No past medical history on file  No past surgical history on file  Social History     Socioeconomic History    Marital status: Single     Spouse name: Not on file    Number of children: Not on file    Years of education: Not on file    Highest education level: Not on file   Occupational History    Not on file   Tobacco Use    Smoking status: Never Smoker    Smokeless tobacco: Never Used   Substance and Sexual Activity    Alcohol use: Not Currently    Drug use: Not Currently    Sexual activity: Not Currently   Other Topics Concern    Not on file   Social History Narrative    Not on file     Social Determinants of Health     Financial Resource Strain:     Difficulty of Paying Living Expenses:    Food Insecurity:     Worried About Running Out of Food in the Last Year:     [address] in the Last Year:    Transportation Needs:     Lack of Transportation (Medical):      Lack of Transportation (Non-Medical):    Physical Activity:     Days of Exercise per Week:     Minutes of Exercise per Session:    Stress:     Feeling of Stress :    Social Connections:     Frequency of Communication with Friends and Family:     Frequency of Social Gatherings with Friends and Family:     Attends Taoist Services:     Active Member of Clubs or Organizations:     Attends Club or Organization Meetings:     Marital Status:    Intimate Partner Violence:     Fear of Current or Ex-Partner:     Emotionally Abused:     Physically Abused:     Sexually Abused:        Objective   /68   Pulse 92   Temp 98 3 °F (36 8 °C)   Resp 18   Ht 5' 5" (1 651 m)   Wt 59 kg (130 lb)   SpO2 98%   BMI 21 63 kg/m²      Physical Exam     Physical Exam  Vitals and nursing note reviewed  Constitutional:       General: She is not in acute distress  Appearance: She is well-developed  She is not diaphoretic  HENT:      Head: Normocephalic and atraumatic  Right Ear: External ear normal       Left Ear: External ear normal       Nose: Nose normal    Eyes:      General: No scleral icterus  Right eye: No discharge  Left eye: No discharge  Conjunctiva/sclera: Conjunctivae normal    Cardiovascular:      Rate and Rhythm: Normal rate and regular rhythm  Heart sounds: Normal heart sounds  No murmur heard  No friction rub  No gallop  Pulmonary:      Effort: Pulmonary effort is normal  No respiratory distress  Breath sounds: Normal breath sounds  No decreased breath sounds, wheezing, rhonchi or rales  Abdominal:      General: Bowel sounds are normal  There is no distension  Palpations: Abdomen is soft  There is no mass  Tenderness: There is abdominal tenderness in the suprapubic area  There is no right CVA tenderness, left CVA tenderness, guarding or rebound  Hernia: No hernia is present  Skin:     General: Skin is warm and dry  Coloration: Skin is not pale  Findings: No erythema or rash  Neurological:      Mental Status: She is alert and oriented to person, place, and time  Psychiatric:         Behavior: Behavior normal          Thought Content:  Thought content normal          Judgment: Judgment normal          Jaya Beard PA-C

## 2021-06-17 LAB — BACTERIA UR CULT: ABNORMAL

## 2021-07-12 DIAGNOSIS — G47.26 SHIFT WORK SLEEP DISORDER: ICD-10-CM

## 2021-07-12 RX ORDER — ARMODAFINIL 150 MG/1
TABLET ORAL
Qty: 30 TABLET | Refills: 0 | Status: SHIPPED | OUTPATIENT
Start: 2021-07-12 | End: 2021-08-18

## 2021-08-17 DIAGNOSIS — G47.26 SHIFT WORK SLEEP DISORDER: ICD-10-CM

## 2021-08-17 RX ORDER — NORELGESTROMIN AND ETHINYL ESTRADIOL 35; 150 UG/D; UG/D
PATCH TRANSDERMAL
Qty: 3 PATCH | Refills: 3 | Status: SHIPPED | OUTPATIENT
Start: 2021-08-17 | End: 2021-10-21 | Stop reason: SDUPTHER

## 2021-08-18 RX ORDER — ARMODAFINIL 150 MG/1
TABLET ORAL
Qty: 30 TABLET | Refills: 0 | Status: SHIPPED | OUTPATIENT
Start: 2021-08-18 | End: 2021-09-17

## 2021-08-18 RX ORDER — NORELGESTROMIN AND ETHINYL ESTRADIOL 150; 35 UG/D; UG/D
PATCH TRANSDERMAL
Qty: 3 PATCH | OUTPATIENT
Start: 2021-08-18

## 2021-09-17 DIAGNOSIS — G47.26 SHIFT WORK SLEEP DISORDER: ICD-10-CM

## 2021-09-17 RX ORDER — ARMODAFINIL 150 MG/1
TABLET ORAL
Qty: 30 TABLET | Refills: 0 | Status: SHIPPED | OUTPATIENT
Start: 2021-09-17 | End: 2021-10-21

## 2021-10-20 DIAGNOSIS — G47.26 SHIFT WORK SLEEP DISORDER: ICD-10-CM

## 2021-10-21 RX ORDER — ARMODAFINIL 150 MG/1
TABLET ORAL
Qty: 30 TABLET | Refills: 0 | Status: SHIPPED | OUTPATIENT
Start: 2021-10-21 | End: 2021-11-18 | Stop reason: SDUPTHER

## 2021-10-21 RX ORDER — NORELGESTROMIN AND ETHINYL ESTRADIOL 35; 150 UG/D; UG/D
PATCH TRANSDERMAL
Qty: 3 PATCH | Refills: 3 | Status: SHIPPED | OUTPATIENT
Start: 2021-10-21 | End: 2022-01-15

## 2021-10-21 NOTE — TELEPHONE ENCOUNTER
Medicine Refill Request    Query RX zafemy      Last Office Visit: 5/19/2021  Last Telemedicine Visit: Visit date not found    Next Office Visit: 11/18/2021  Next Telemedicine Visit: Visit date not found         Current Outpatient Medications:   •  armodafiniL 150 mg tablet, TAKE 1 TABLET(150 MG) BY MOUTH EVERY MORNING, Disp: 30 tablet, Rfl: 0  •  multivitamin (THERAGRAN) tablet, take 1 tablet by oral route  every day, Disp: , Rfl:   •  tretinoin (RETIN-A) 0.05 % cream, 0.05 %., Disp: , Rfl:   •  ZAFEMY 150-35 mcg/24 hr, APPLY 1 PATCH TO SKIN EVERY WEEK FOR 3 WEEKS, THEN REMOVE FOR 1 WEEK, Disp: 3 patch, Rfl: 3      BP Readings from Last 3 Encounters:   05/19/21 90/60   11/10/20 110/80   10/01/19 100/60       Recent Lab results:  Lab Results   Component Value Date    CHOL 155 11/10/2020   ,   Lab Results   Component Value Date    HDL 70 11/10/2020   ,   Lab Results   Component Value Date    LDLCALC 70 11/10/2020   ,   Lab Results   Component Value Date    TRIG 81 11/10/2020        Lab Results   Component Value Date    GLUCOSE 98 11/10/2020   , No results found for: HGBA1C      Lab Results   Component Value Date    CREATININE 0.80 11/10/2020       Lab Results   Component Value Date    TSH 1.430 11/10/2020

## 2021-11-18 ENCOUNTER — OFFICE VISIT (OUTPATIENT)
Dept: FAMILY MEDICINE | Facility: CLINIC | Age: 36
End: 2021-11-18
Payer: COMMERCIAL

## 2021-11-18 VITALS
SYSTOLIC BLOOD PRESSURE: 98 MMHG | HEIGHT: 65 IN | TEMPERATURE: 98.2 F | DIASTOLIC BLOOD PRESSURE: 60 MMHG | OXYGEN SATURATION: 98 % | HEART RATE: 76 BPM | WEIGHT: 126 LBS | BODY MASS INDEX: 20.99 KG/M2 | RESPIRATION RATE: 16 BRPM

## 2021-11-18 DIAGNOSIS — G47.26 SHIFT WORK SLEEP DISORDER: ICD-10-CM

## 2021-11-18 DIAGNOSIS — Z00.00 PHYSICAL EXAM: Primary | ICD-10-CM

## 2021-11-18 PROCEDURE — 99395 PREV VISIT EST AGE 18-39: CPT | Performed by: FAMILY MEDICINE

## 2021-11-18 PROCEDURE — 3008F BODY MASS INDEX DOCD: CPT | Performed by: FAMILY MEDICINE

## 2021-11-18 RX ORDER — ARMODAFINIL 150 MG/1
150 TABLET ORAL DAILY
Qty: 30 TABLET | Refills: 0 | Status: SHIPPED | OUTPATIENT
Start: 2021-11-18 | End: 2021-12-21

## 2021-11-18 ASSESSMENT — ENCOUNTER SYMPTOMS
DIARRHEA: 0
VOICE CHANGE: 0
BACK PAIN: 0
FATIGUE: 0
EYE PAIN: 0
ABDOMINAL PAIN: 0
VOMITING: 0
LIGHT-HEADEDNESS: 0
DYSURIA: 0
EYE DISCHARGE: 0
ACTIVITY CHANGE: 0
RHINORRHEA: 0
NAUSEA: 0
WEAKNESS: 0
SORE THROAT: 0
PHOTOPHOBIA: 0
DIZZINESS: 0
WHEEZING: 0
NECK PAIN: 0
NERVOUS/ANXIOUS: 0
NUMBNESS: 0
CONSTIPATION: 0
FREQUENCY: 0
JOINT SWELLING: 0
SINUS PAIN: 0
TROUBLE SWALLOWING: 0
ABDOMINAL DISTENTION: 0
DYSPHORIC MOOD: 0
SHORTNESS OF BREATH: 0
SLEEP DISTURBANCE: 0
HEADACHES: 0
DIFFICULTY URINATING: 0
APPETITE CHANGE: 0
UNEXPECTED WEIGHT CHANGE: 0
HEMATURIA: 0
CHEST TIGHTNESS: 0
APNEA: 0
PALPITATIONS: 0
EYE REDNESS: 0
BLOOD IN STOOL: 0
COUGH: 0

## 2021-11-18 NOTE — PROGRESS NOTES
"      Subjective      Patient ID: Alliana K Kalisch is a 36 y.o. female.  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   HENT: Negative for congestion, ear pain, hearing loss, rhinorrhea, sinus pain, sneezing, sore throat, trouble swallowing and voice change.    Eyes: Negative for photophobia, pain, discharge, redness and visual disturbance.   Respiratory: Negative for apnea, cough, chest tightness, shortness of breath and wheezing.    Cardiovascular: Negative for chest pain, palpitations and leg swelling.   Gastrointestinal: Negative for abdominal distention, abdominal pain, blood in stool, constipation, diarrhea, nausea and vomiting.   Endocrine: Negative for cold intolerance and heat intolerance.   Genitourinary: Negative for decreased urine volume, difficulty urinating, dysuria, frequency, hematuria, urgency, vaginal bleeding, vaginal discharge and vaginal pain.   Musculoskeletal: Negative for back pain, joint swelling and neck pain.   Skin: Negative for rash.   Neurological: Negative for dizziness, weakness, light-headedness, numbness and headaches.   Psychiatric/Behavioral: Negative for dysphoric mood and sleep disturbance. The patient is not nervous/anxious.        Objective     Vitals:    11/18/21 1003   BP: 98/60   BP Location: Left upper arm   Patient Position: Sitting   Pulse: 76   Resp: 16   Temp: 36.8 °C (98.2 °F)   TempSrc: Oral   SpO2: 98%   Weight: 57.2 kg (126 lb)   Height: 1.651 m (5' 5\")     Body mass index is 20.97 kg/m².    Physical " Exam  Vitals and nursing note reviewed.   Constitutional:       Appearance: She is well-developed.   HENT:      Head: Normocephalic.      Right Ear: Tympanic membrane, ear canal and external ear normal.      Left Ear: Tympanic membrane, ear canal and external ear normal.      Nose: Nose normal.   Eyes:      Conjunctiva/sclera: Conjunctivae normal.      Pupils: Pupils are equal, round, and reactive to light.   Neck:      Vascular: No JVD.   Cardiovascular:      Rate and Rhythm: Normal rate and regular rhythm.      Heart sounds: No murmur heard.      Pulmonary:      Effort: Pulmonary effort is normal. No respiratory distress.      Breath sounds: Normal breath sounds. No wheezing.   Chest:   Breasts:      Right: No inverted nipple, mass, nipple discharge, skin change or tenderness.      Left: No inverted nipple, mass, nipple discharge, skin change or tenderness.       Abdominal:      General: Bowel sounds are normal. There is no distension.      Palpations: Abdomen is soft. There is no mass.      Tenderness: There is no abdominal tenderness. There is no guarding or rebound.      Hernia: No hernia is present.   Musculoskeletal:         General: No tenderness.   Lymphadenopathy:      Cervical: No cervical adenopathy.   Skin:     General: Skin is warm and dry.   Neurological:      Mental Status: She is alert and oriented to person, place, and time.      Sensory: No sensory deficit.      Deep Tendon Reflexes: Reflexes normal.   Psychiatric:         Mood and Affect: Mood normal.         Behavior: Behavior normal.         Assessment/Plan   Diagnoses and all orders for this visit:    Physical exam (Primary)  Comments:  up to date on pap  check labs  diet/exercise  Orders:  -     Lipid panel; Future  -     TSH w reflex FT4; Future  -     Comprehensive metabolic panel; Future  -     CBC and Differential; Future    Shift work sleep disorder  Assessment & Plan:  Continue armodafinil    Orders:  -     armodafiniL 150 mg tablet;  Take 1 tablet (150 mg total) by mouth daily.      Current Outpatient Medications:   •  armodafiniL 150 mg tablet, Take 1 tablet (150 mg total) by mouth daily., Disp: 30 tablet, Rfl: 0  •  multivitamin (THERAGRAN) tablet, take 1 tablet by oral route  every day, Disp: , Rfl:   •  norelgestromin-ethin.estradioL (ZAFEMY) 150-35 mcg/24 hr, Apply 1 patch each week for 3 weeks, then remove for 1 week., Disp: 3 patch, Rfl: 3  •  tretinoin (RETIN-A) 0.05 % cream, 0.05 %., Disp: , Rfl:   Return in about 1 year (around 11/18/2022) for PE.  Patient  agreeable with plan and verbalized understanding

## 2021-12-21 DIAGNOSIS — G47.26 SHIFT WORK SLEEP DISORDER: ICD-10-CM

## 2021-12-21 RX ORDER — ARMODAFINIL 150 MG/1
TABLET ORAL
Qty: 30 TABLET | Refills: 0 | Status: SHIPPED | OUTPATIENT
Start: 2021-12-21 | End: 2022-01-27

## 2021-12-21 NOTE — TELEPHONE ENCOUNTER
Medicine Refill Request    Last Office: 11/18/2021   Last Consult Visit: Visit date not found  Last Telemedicine Visit: Visit date not found    Next Appointment: 11/21/2022      Current Outpatient Medications:   •  armodafiniL 150 mg tablet, Take 1 tablet (150 mg total) by mouth daily., Disp: 30 tablet, Rfl: 0  •  multivitamin (THERAGRAN) tablet, take 1 tablet by oral route  every day, Disp: , Rfl:   •  norelgestromin-ethin.estradioL (ZAFEMY) 150-35 mcg/24 hr, Apply 1 patch each week for 3 weeks, then remove for 1 week., Disp: 3 patch, Rfl: 3  •  tretinoin (RETIN-A) 0.05 % cream, 0.05 %., Disp: , Rfl:       BP Readings from Last 3 Encounters:   11/18/21 98/60   05/19/21 90/60   11/10/20 110/80       Recent Lab results:  Lab Results   Component Value Date    CHOL 155 11/10/2020   ,   Lab Results   Component Value Date    HDL 70 11/10/2020   ,   Lab Results   Component Value Date    LDLCALC 70 11/10/2020   ,   Lab Results   Component Value Date    TRIG 81 11/10/2020        Lab Results   Component Value Date    GLUCOSE 98 11/10/2020   , No results found for: HGBA1C      Lab Results   Component Value Date    CREATININE 0.80 11/10/2020       Lab Results   Component Value Date    TSH 1.430 11/10/2020

## 2022-01-15 RX ORDER — NORELGESTROMIN AND ETHINYL ESTRADIOL 150; 35 UG/D; UG/D
PATCH TRANSDERMAL
Qty: 3 PATCH | Refills: 3 | Status: SHIPPED | OUTPATIENT
Start: 2022-01-15 | End: 2022-05-24

## 2022-01-27 DIAGNOSIS — G47.26 SHIFT WORK SLEEP DISORDER: ICD-10-CM

## 2022-01-27 RX ORDER — ARMODAFINIL 150 MG/1
TABLET ORAL
Qty: 30 TABLET | Refills: 0 | Status: SHIPPED | OUTPATIENT
Start: 2022-01-27 | End: 2022-03-18

## 2022-03-17 DIAGNOSIS — G47.26 SHIFT WORK SLEEP DISORDER: ICD-10-CM

## 2022-03-18 RX ORDER — ARMODAFINIL 150 MG/1
TABLET ORAL
Qty: 30 TABLET | Refills: 0 | Status: SHIPPED | OUTPATIENT
Start: 2022-03-18 | End: 2022-07-18

## 2022-05-24 RX ORDER — NORELGESTROMIN AND ETHINYL ESTRADIOL 150; 35 UG/D; UG/D
PATCH TRANSDERMAL
Qty: 3 PATCH | Refills: 3 | Status: SHIPPED | OUTPATIENT
Start: 2022-05-24 | End: 2022-09-28

## 2022-07-18 DIAGNOSIS — G47.26 SHIFT WORK SLEEP DISORDER: ICD-10-CM

## 2022-07-18 RX ORDER — ARMODAFINIL 150 MG/1
TABLET ORAL
Qty: 30 TABLET | Refills: 0 | Status: SHIPPED | OUTPATIENT
Start: 2022-07-18 | End: 2022-08-27

## 2022-08-27 DIAGNOSIS — G47.26 SHIFT WORK SLEEP DISORDER: ICD-10-CM

## 2022-08-27 RX ORDER — ARMODAFINIL 150 MG/1
TABLET ORAL
Qty: 30 TABLET | Refills: 0 | Status: SHIPPED | OUTPATIENT
Start: 2022-08-27 | End: 2023-01-06

## 2022-09-28 ENCOUNTER — OFFICE VISIT (OUTPATIENT)
Dept: FAMILY MEDICINE | Facility: CLINIC | Age: 37
End: 2022-09-28
Payer: COMMERCIAL

## 2022-09-28 VITALS
RESPIRATION RATE: 16 BRPM | HEIGHT: 65 IN | BODY MASS INDEX: 18.83 KG/M2 | OXYGEN SATURATION: 98 % | HEART RATE: 68 BPM | SYSTOLIC BLOOD PRESSURE: 100 MMHG | DIASTOLIC BLOOD PRESSURE: 70 MMHG | WEIGHT: 113 LBS | TEMPERATURE: 98 F

## 2022-09-28 DIAGNOSIS — A02.0 COLITIS DUE TO SALMONELLA SPECIES: Primary | ICD-10-CM

## 2022-09-28 DIAGNOSIS — R19.7 DIARRHEA, UNSPECIFIED TYPE: ICD-10-CM

## 2022-09-28 PROCEDURE — 3008F BODY MASS INDEX DOCD: CPT | Performed by: FAMILY MEDICINE

## 2022-09-28 PROCEDURE — 99213 OFFICE O/P EST LOW 20 MIN: CPT | Performed by: FAMILY MEDICINE

## 2022-09-28 RX ORDER — AZITHROMYCIN 250 MG/1
250 TABLET, FILM COATED ORAL DAILY
COMMUNITY
End: 2022-09-28

## 2022-09-28 RX ORDER — AZITHROMYCIN 500 MG/1
500 TABLET, FILM COATED ORAL DAILY
COMMUNITY
End: 2022-11-21

## 2022-09-28 ASSESSMENT — ENCOUNTER SYMPTOMS
FATIGUE: 0
DIARRHEA: 1
VOMITING: 0
COUGH: 0
DIZZINESS: 0
ABDOMINAL PAIN: 1
PALPITATIONS: 0
DIFFICULTY URINATING: 0
LIGHT-HEADEDNESS: 0
NAUSEA: 0
NUMBNESS: 0
WHEEZING: 0
CONSTIPATION: 0
SHORTNESS OF BREATH: 0
WEAKNESS: 0
ACTIVITY CHANGE: 0
APPETITE CHANGE: 0
ABDOMINAL DISTENTION: 0
FREQUENCY: 0

## 2022-09-28 NOTE — PROGRESS NOTES
"      Subjective      Patient ID: Alliana K Kalisch is a 37 y.o. female.  1985      HPI  Pt presents for hospital follow up  Was diagnosed with Salmonella colitis  Started with symptoms Thursday 9/15 diarrhea continue to increased and had pain went to ER on 19th and discharged.  And went back 9/20 and was admitted and was given iv cetriaxone and azithromax and home on azithromycin for 5 days and has 1 day left  Still having diarrhea but less  Slowing advancing diet  Has always has issues with bowels depending on what she eats has never had work up  The following have been reviewed and updated as appropriate in this visit:   Allergies  Meds  Problems         Review of Systems   Constitutional: Negative for activity change, appetite change and fatigue.   Respiratory: Negative for cough, shortness of breath and wheezing.    Cardiovascular: Negative for chest pain, palpitations and leg swelling.   Gastrointestinal: Positive for abdominal pain and diarrhea. Negative for abdominal distention, constipation, nausea and vomiting.   Endocrine: Negative for cold intolerance and heat intolerance.   Genitourinary: Negative for difficulty urinating and frequency.   Skin: Negative for rash.   Neurological: Negative for dizziness, weakness, light-headedness and numbness.       Objective     Vitals:    09/28/22 1054   BP: 100/70   BP Location: Left upper arm   Patient Position: Sitting   Pulse: 68   Resp: 16   Temp: 36.7 °C (98 °F)   TempSrc: Oral   SpO2: 98%   Weight: 51.3 kg (113 lb)   Height: 1.651 m (5' 5\")     Body mass index is 18.8 kg/m².    Physical Exam  Vitals and nursing note reviewed.   Constitutional:       Appearance: She is well-developed.   Neck:      Vascular: No carotid bruit or JVD.   Cardiovascular:      Rate and Rhythm: Normal rate and regular rhythm.      Heart sounds: Normal heart sounds. No murmur heard.  Pulmonary:      Effort: Pulmonary effort is normal. No respiratory distress.      Breath sounds: " Normal breath sounds. No wheezing.   Abdominal:      General: Bowel sounds are normal. There is no distension.      Palpations: Abdomen is soft. There is no mass.      Tenderness: There is abdominal tenderness (diffuse mild). There is no guarding or rebound.      Hernia: No hernia is present.   Musculoskeletal:         General: No tenderness.   Lymphadenopathy:      Cervical: No cervical adenopathy.   Skin:     General: Skin is warm and dry.   Neurological:      Mental Status: She is alert and oriented to person, place, and time.   Psychiatric:         Mood and Affect: Mood normal.         Behavior: Behavior normal.         Assessment/Plan   Diagnoses and all orders for this visit:    Colitis due to Salmonella species (Primary)  Comments:  complete azithromycin    Diarrhea, unspecified type  Comments:  due to long standing issues   would recommend pt see GI    No follow-ups on file.  Patient  agreeable with plan and verbalized understanding

## 2022-09-29 ENCOUNTER — TELEPHONE (OUTPATIENT)
Dept: FAMILY MEDICINE | Facility: CLINIC | Age: 37
End: 2022-09-29
Payer: COMMERCIAL

## 2022-09-29 NOTE — TELEPHONE ENCOUNTER
Call from Dr. Hugo Ivy from [facility]   Patient recently tx for Salmonella gastroenteritis & discharged  Lab resulted after discharged  Positive Celiac indicated due to antibodies and genetics  Per physician, Patient was told before she left that they were suspicious of celiac  So patient aware of possible diagnosis  Physician wanted to make sure PCP aware to discuss with patient further

## 2022-11-21 ENCOUNTER — OFFICE VISIT (OUTPATIENT)
Dept: FAMILY MEDICINE | Facility: CLINIC | Age: 37
End: 2022-11-21
Payer: COMMERCIAL

## 2022-11-21 VITALS
WEIGHT: 118 LBS | RESPIRATION RATE: 14 BRPM | BODY MASS INDEX: 19.66 KG/M2 | DIASTOLIC BLOOD PRESSURE: 72 MMHG | HEIGHT: 65 IN | HEART RATE: 66 BPM | OXYGEN SATURATION: 99 % | SYSTOLIC BLOOD PRESSURE: 106 MMHG | TEMPERATURE: 97.2 F

## 2022-11-21 DIAGNOSIS — G47.26 SHIFT WORK SLEEP DISORDER: ICD-10-CM

## 2022-11-21 DIAGNOSIS — Z00.00 PHYSICAL EXAM: Primary | ICD-10-CM

## 2022-11-21 PROCEDURE — 3008F BODY MASS INDEX DOCD: CPT | Performed by: FAMILY MEDICINE

## 2022-11-21 PROCEDURE — 99395 PREV VISIT EST AGE 18-39: CPT | Performed by: FAMILY MEDICINE

## 2022-11-21 ASSESSMENT — ENCOUNTER SYMPTOMS
SORE THROAT: 0
ABDOMINAL DISTENTION: 0
HEMATURIA: 0
NUMBNESS: 0
FREQUENCY: 0
LIGHT-HEADEDNESS: 0
VOICE CHANGE: 0
PHOTOPHOBIA: 0
EYE REDNESS: 0
DIZZINESS: 0
CHEST TIGHTNESS: 0
DYSURIA: 0
SHORTNESS OF BREATH: 0
BACK PAIN: 0
CONSTIPATION: 0
VOMITING: 0
HEADACHES: 0
APNEA: 0
ABDOMINAL PAIN: 0
DIFFICULTY URINATING: 0
NAUSEA: 0
BLOOD IN STOOL: 0
EYE DISCHARGE: 0
TROUBLE SWALLOWING: 0
ACTIVITY CHANGE: 0
DIARRHEA: 0
APPETITE CHANGE: 0
SLEEP DISTURBANCE: 0
PALPITATIONS: 0
RHINORRHEA: 0
COUGH: 0
EYE PAIN: 0
JOINT SWELLING: 0
FATIGUE: 0
NERVOUS/ANXIOUS: 0
NECK PAIN: 0
WEAKNESS: 0
WHEEZING: 0
DYSPHORIC MOOD: 0
SINUS PAIN: 0
UNEXPECTED WEIGHT CHANGE: 0

## 2022-11-21 NOTE — PROGRESS NOTES
Subjective      Patient ID: Alliana K Kalisch is a 37 y.o. female.  1985      HPI  Pt presents for physical exam  Shift work sleep disorder doing 3p to 3 A during week  11p to 11 am on weekends  Not working as many hours  Sexually active not using protection wants IUD  In relationship  Had colon which just showed inflammation  She has been feeling better  Has some right knee pain after running long distance  The following have been reviewed and updated as appropriate in this visit:   Tobacco  Allergies  Meds  Problems  Med Hx  Surg Hx  Fam Hx  Soc   Hx      Review of Systems   Constitutional: Negative for activity change, appetite change, fatigue and unexpected weight change.   HENT: Negative for congestion, ear pain, hearing loss, rhinorrhea, sinus pain, sneezing, sore throat, trouble swallowing and voice change.    Eyes: Negative for photophobia, pain, discharge, redness and visual disturbance.   Respiratory: Negative for apnea, cough, chest tightness, shortness of breath and wheezing.    Cardiovascular: Negative for chest pain, palpitations and leg swelling.   Gastrointestinal: Negative for abdominal distention, abdominal pain, blood in stool, constipation, diarrhea, nausea and vomiting.   Endocrine: Negative for cold intolerance and heat intolerance.   Genitourinary: Negative for decreased urine volume, difficulty urinating, dysuria, frequency, hematuria, urgency, vaginal bleeding, vaginal discharge and vaginal pain.   Musculoskeletal: Negative for back pain, joint swelling and neck pain.   Skin: Negative for rash.   Neurological: Negative for dizziness, weakness, light-headedness, numbness and headaches.   Psychiatric/Behavioral: Negative for dysphoric mood and sleep disturbance. The patient is not nervous/anxious.        Objective     Vitals:    11/21/22 1004   BP: 106/72   BP Location: Left upper arm   Patient Position: Sitting   Pulse: 66   Resp: 14   Temp: 36.2 °C (97.2 °F)   TempSrc: Oral  "  SpO2: 99%   Weight: 53.5 kg (118 lb)   Height: 1.651 m (5' 5\")     Body mass index is 19.64 kg/m².    Physical Exam  Vitals and nursing note reviewed.   Constitutional:       Appearance: She is well-developed.   HENT:      Head: Normocephalic.      Right Ear: Tympanic membrane, ear canal and external ear normal.      Left Ear: Tympanic membrane, ear canal and external ear normal.      Nose: Nose normal.   Eyes:      Conjunctiva/sclera: Conjunctivae normal.      Pupils: Pupils are equal, round, and reactive to light.   Neck:      Vascular: No JVD.   Cardiovascular:      Rate and Rhythm: Normal rate and regular rhythm.      Heart sounds: No murmur heard.  Pulmonary:      Effort: Pulmonary effort is normal. No respiratory distress.      Breath sounds: Normal breath sounds. No wheezing.   Abdominal:      General: Bowel sounds are normal. There is no distension.      Palpations: Abdomen is soft. There is no mass.      Tenderness: There is no abdominal tenderness. There is no guarding or rebound.      Hernia: No hernia is present.   Musculoskeletal:         General: No tenderness.   Lymphadenopathy:      Cervical: No cervical adenopathy.   Skin:     General: Skin is warm and dry.   Neurological:      Mental Status: She is alert and oriented to person, place, and time.      Sensory: No sensory deficit.      Deep Tendon Reflexes: Reflexes normal.   Psychiatric:         Mood and Affect: Mood normal.         Behavior: Behavior normal.         Assessment/Plan   Diagnoses and all orders for this visit:    Physical exam (Primary)  Comments:  diet/exercise  had labs in hospital does not want to do lipids at this time  up to date on pap      Shift work sleep disorder  Comments:  continue nuvigil        Current Outpatient Medications:     armodafiniL (NUVIGIL) 150 mg tablet, TAKE 1 TABLET(150 MG) BY MOUTH DAILY, Disp: 30 tablet, Rfl: 0    multivitamin (THERAGRAN) tablet, take 1 tablet by oral route  every day, Disp: , Rfl:     " tretinoin (RETIN-A) 0.05 % cream, 0.05 %., Disp: , Rfl:   Return in about 1 year (around 11/21/2023) for PE.  Patient  agreeable with plan and verbalized understanding

## 2023-01-05 DIAGNOSIS — G47.26 SHIFT WORK SLEEP DISORDER: ICD-10-CM

## 2023-01-06 RX ORDER — NORELGESTROMIN AND ETHINYL ESTRADIOL 35; 150 UG/D; UG/D
PATCH TRANSDERMAL
Qty: 3 PATCH | Refills: 3 | OUTPATIENT
Start: 2023-01-06

## 2023-01-06 RX ORDER — ARMODAFINIL 150 MG/1
TABLET ORAL
Qty: 30 TABLET | Refills: 0 | Status: SHIPPED | OUTPATIENT
Start: 2023-01-06 | End: 2023-06-21

## 2023-01-18 RX ORDER — NORELGESTROMIN AND ETHINYL ESTRADIOL 150; 35 UG/D; UG/D
PATCH TRANSDERMAL
Qty: 3 PATCH | Refills: 3 | OUTPATIENT
Start: 2023-01-18

## 2023-01-18 NOTE — TELEPHONE ENCOUNTER
Pt said can be either patch, last one filled looks like it was lane        Medicine Refill Request    Last Office: 11/21/2022   Last Consult Visit: Visit date not found  Last Telemedicine Visit: Visit date not found    Next Appointment: Visit date not found      Current Outpatient Medications:   •  armodafiniL (NUVIGIL) 150 mg tablet, TAKE 1 TABLET(150 MG) BY MOUTH DAILY, Disp: 30 tablet, Rfl: 0  •  multivitamin (THERAGRAN) tablet, take 1 tablet by oral route  every day, Disp: , Rfl:   •  tretinoin (RETIN-A) 0.05 % cream, 0.05 %., Disp: , Rfl:       BP Readings from Last 3 Encounters:   11/21/22 106/72   09/28/22 100/70   11/18/21 98/60       Recent Lab results:  Lab Results   Component Value Date    CHOL 155 11/10/2020   ,   Lab Results   Component Value Date    HDL 70 11/10/2020   ,   Lab Results   Component Value Date    LDLCALC 70 11/10/2020   ,   Lab Results   Component Value Date    TRIG 81 11/10/2020        Lab Results   Component Value Date    GLUCOSE 98 11/10/2020   , No results found for: HGBA1C      Lab Results   Component Value Date    CREATININE 0.80 11/10/2020       Lab Results   Component Value Date    TSH 1.430 11/10/2020

## 2023-05-12 NOTE — TELEPHONE ENCOUNTER
Medicine Refill Request  Query Rx  Last Office Visit: 10/1/2019  Next Office Visit: Visit date not found        Current Outpatient Medications:   •  armodafinil (NUVIGIL) 150 mg tablet, Take 1 tablet PO as needed for night shift work, Disp: 30 tablet, Rfl: 0  •  hydrocortisone (ANUSOL-HC) 25 mg suppository, Insert 1 suppository (25 mg total) into the rectum 2 (two) times a day for 10 days., Disp: 20 suppository, Rfl: 0  •  levonorgestrel-ethinyl estrad (NORDETTE) 0.15-0.03 mg per tablet, Take 1 tablet by mouth daily., Disp: 28 tablet, Rfl: 12  •  multivitamin (THERAGRAN) tablet, take 1 tablet by oral route  every day, Disp: , Rfl:   •  tretinoin (RETIN-A) 0.05 % cream, 0.05 %., Disp: , Rfl:       BP Readings from Last 3 Encounters:   10/01/19 100/60   05/24/19 110/64   02/25/19 90/60       Recent Lab results:  No results found for: CHOL, No results found for: HDL, No results found for: LDLCALC, No results found for: TRIG     No results found for: GLUCOSE, No results found for: HGBA1C      No results found for: CREATININE    Lab Results   Component Value Date    TSH 0.793 08/02/2018            · With suprapubic discomfort and also with mild RUQ tenderness  · CT shows CBD dilation without calcified gallstones or pericholecystic inflammatory change    · No transaminitis, bilirubin normal   · Lipase normal  · UA negative  · Will obtain MRCP  · Consider GI eval based on MRCP results

## 2023-06-21 DIAGNOSIS — G47.26 SHIFT WORK SLEEP DISORDER: ICD-10-CM

## 2023-06-21 RX ORDER — ARMODAFINIL 150 MG/1
TABLET ORAL
Qty: 30 TABLET | Refills: 0 | Status: SHIPPED | OUTPATIENT
Start: 2023-06-21 | End: 2023-09-08

## 2023-07-31 NOTE — TELEPHONE ENCOUNTER
Medicine Refill Request    Last Office Visit: 10/1/2019  Last Telemedicine Visit: Visit date not found    Next Office Visit: Visit date not found  Next Telemedicine Visit: Visit date not found         Current Outpatient Medications:   •  armodafiniL (NUVIGIL) 150 mg tablet, Take 1 tablet PO as needed for night shift work, Disp: 30 tablet, Rfl: 0  •  hydrocortisone (ANUSOL-HC) 25 mg suppository, Insert 1 suppository (25 mg total) into the rectum 2 (two) times a day for 10 days., Disp: 20 suppository, Rfl: 0  •  levonorgestrel-ethinyl estrad (NORDETTE) 0.15-0.03 mg per tablet, Take 1 tablet by mouth daily., Disp: 28 tablet, Rfl: 12  •  multivitamin (THERAGRAN) tablet, take 1 tablet by oral route  every day, Disp: , Rfl:   •  norelgestromin-ethin.estradioL (XULANE) 150-35 mcg/24 hr, Apply 1 patch each week for 3 weeks, then remove for 1 week., Disp: 3 patch, Rfl: 12  •  tretinoin (RETIN-A) 0.05 % cream, 0.05 %., Disp: , Rfl:       BP Readings from Last 3 Encounters:   10/01/19 100/60   05/24/19 110/64   02/25/19 90/60       Recent Lab results:  No results found for: CHOL, No results found for: HDL, No results found for: LDLCALC, No results found for: TRIG     No results found for: GLUCOSE, No results found for: HGBA1C      No results found for: CREATININE    Lab Results   Component Value Date    TSH 0.793 08/02/2018             
[email or web address] website queried no abnormalities noted.  Patient is only receiving controlled substances from my office  Prescription sent electronically    
.

## 2023-09-08 DIAGNOSIS — G47.26 SHIFT WORK SLEEP DISORDER: ICD-10-CM

## 2023-09-08 RX ORDER — ARMODAFINIL 150 MG/1
TABLET ORAL
Qty: 30 TABLET | Refills: 0 | Status: SHIPPED | OUTPATIENT
Start: 2023-09-08 | End: 2023-11-09

## 2023-11-09 DIAGNOSIS — G47.26 SHIFT WORK SLEEP DISORDER: ICD-10-CM

## 2023-11-09 RX ORDER — ARMODAFINIL 150 MG/1
TABLET ORAL
Qty: 30 TABLET | Refills: 0 | Status: SHIPPED | OUTPATIENT
Start: 2023-11-09 | End: 2024-01-11

## 2024-01-11 DIAGNOSIS — G47.26 SHIFT WORK SLEEP DISORDER: ICD-10-CM

## 2024-01-11 RX ORDER — ARMODAFINIL 150 MG/1
TABLET ORAL
Qty: 30 TABLET | Refills: 0 | Status: SHIPPED | OUTPATIENT
Start: 2024-01-11 | End: 2024-03-03

## 2024-03-03 DIAGNOSIS — G47.26 SHIFT WORK SLEEP DISORDER: ICD-10-CM

## 2024-03-03 RX ORDER — ARMODAFINIL 150 MG/1
TABLET ORAL
Qty: 30 TABLET | Refills: 0 | Status: SHIPPED | OUTPATIENT
Start: 2024-03-03 | End: 2024-04-17

## 2024-04-17 DIAGNOSIS — G47.26 SHIFT WORK SLEEP DISORDER: ICD-10-CM

## 2024-04-17 RX ORDER — ARMODAFINIL 150 MG/1
150 TABLET ORAL DAILY
Qty: 30 TABLET | Refills: 0 | Status: SHIPPED | OUTPATIENT
Start: 2024-04-17 | End: 2024-05-29

## 2024-04-22 DIAGNOSIS — Z12.31 SCREENING MAMMOGRAM FOR BREAST CANCER: Primary | ICD-10-CM

## 2024-05-29 DIAGNOSIS — G47.26 SHIFT WORK SLEEP DISORDER: ICD-10-CM

## 2024-05-29 RX ORDER — ARMODAFINIL 150 MG/1
150 TABLET ORAL DAILY
Qty: 30 TABLET | Refills: 0 | Status: SHIPPED | OUTPATIENT
Start: 2024-05-29 | End: 2024-07-08

## 2024-07-08 DIAGNOSIS — G47.26 SHIFT WORK SLEEP DISORDER: ICD-10-CM

## 2024-07-08 RX ORDER — ARMODAFINIL 150 MG/1
150 TABLET ORAL DAILY
Qty: 30 TABLET | Refills: 0 | Status: SHIPPED | OUTPATIENT
Start: 2024-07-08 | End: 2024-08-13

## 2024-07-10 ENCOUNTER — OFFICE VISIT (OUTPATIENT)
Dept: FAMILY MEDICINE | Facility: CLINIC | Age: 39
End: 2024-07-10
Payer: COMMERCIAL

## 2024-07-10 VITALS
OXYGEN SATURATION: 99 % | WEIGHT: 113.8 LBS | RESPIRATION RATE: 16 BRPM | BODY MASS INDEX: 18.96 KG/M2 | DIASTOLIC BLOOD PRESSURE: 60 MMHG | SYSTOLIC BLOOD PRESSURE: 96 MMHG | HEART RATE: 69 BPM | HEIGHT: 65 IN | TEMPERATURE: 98 F

## 2024-07-10 DIAGNOSIS — M54.50 ACUTE BILATERAL LOW BACK PAIN WITHOUT SCIATICA: ICD-10-CM

## 2024-07-10 DIAGNOSIS — G47.26 SHIFT WORK SLEEP DISORDER: ICD-10-CM

## 2024-07-10 DIAGNOSIS — Z00.00 PHYSICAL EXAM: Primary | ICD-10-CM

## 2024-07-10 PROCEDURE — 99396 PREV VISIT EST AGE 40-64: CPT | Performed by: FAMILY MEDICINE

## 2024-07-10 PROCEDURE — 3008F BODY MASS INDEX DOCD: CPT | Performed by: FAMILY MEDICINE

## 2024-07-10 ASSESSMENT — ENCOUNTER SYMPTOMS
ABDOMINAL PAIN: 0
PALPITATIONS: 0
DYSPHORIC MOOD: 1
TROUBLE SWALLOWING: 0
DIFFICULTY URINATING: 0
DYSURIA: 0
NERVOUS/ANXIOUS: 0
CONSTIPATION: 0
NECK PAIN: 0
UNEXPECTED WEIGHT CHANGE: 0
HEADACHES: 0
VOMITING: 0
SLEEP DISTURBANCE: 0
RHINORRHEA: 0
ABDOMINAL DISTENTION: 0
FREQUENCY: 0
DIZZINESS: 0
COUGH: 0
APNEA: 0
FATIGUE: 0
NUMBNESS: 0
BLOOD IN STOOL: 0
HEMATURIA: 0
APPETITE CHANGE: 0
SHORTNESS OF BREATH: 0
CHEST TIGHTNESS: 0
BACK PAIN: 0
JOINT SWELLING: 0
EYE PAIN: 0
EYE REDNESS: 0
LIGHT-HEADEDNESS: 0
ACTIVITY CHANGE: 0
PHOTOPHOBIA: 0
WHEEZING: 0
NAUSEA: 0
SINUS PAIN: 0
WEAKNESS: 0
VOICE CHANGE: 0
DIARRHEA: 0
SORE THROAT: 0
EYE DISCHARGE: 0

## 2024-07-10 ASSESSMENT — PATIENT HEALTH QUESTIONNAIRE - PHQ9: SUM OF ALL RESPONSES TO PHQ9 QUESTIONS 1 & 2: 0

## 2024-07-10 ASSESSMENT — PAIN SCALES - GENERAL: PAINLEVEL: 1

## 2024-07-10 NOTE — PROGRESS NOTES
"    Subjective      Patient ID: Alliana K Kalisch is a 38 y.o. female.  1985      HPI  Pt presents for physical exam  Working shift works a [place] [employer] helps  Was carrying watermelons at [employer] and having some low back pain  The following have been reviewed and updated as appropriate in this visit:   Tobacco  Allergies  Meds  Problems  Med Hx  Surg Hx  Fam Hx  Soc   Hx      Review of Systems   Constitutional:  Negative for activity change, appetite change, fatigue and unexpected weight change.   HENT:  Negative for congestion, ear pain, hearing loss, rhinorrhea, sinus pain, sneezing, sore throat, trouble swallowing and voice change.    Eyes:  Negative for photophobia, pain, discharge, redness and visual disturbance.   Respiratory:  Negative for apnea, cough, chest tightness, shortness of breath and wheezing.    Cardiovascular:  Negative for chest pain, palpitations and leg swelling.   Gastrointestinal:  Negative for abdominal distention, abdominal pain, blood in stool, constipation, diarrhea, nausea and vomiting.   Endocrine: Negative for cold intolerance and heat intolerance.   Genitourinary:  Negative for decreased urine volume, difficulty urinating, dysuria, frequency, hematuria, urgency, vaginal bleeding, vaginal discharge and vaginal pain.   Musculoskeletal:  Negative for back pain, joint swelling and neck pain.   Skin:  Negative for rash.   Neurological:  Negative for dizziness, weakness, light-headedness, numbness and headaches.   Psychiatric/Behavioral:  Positive for dysphoric mood (swings with menses). Negative for sleep disturbance. The patient is not nervous/anxious.        Objective     Vitals:    07/10/24 1152   BP: 96/60   BP Location: Left upper arm   Patient Position: Sitting   Pulse: 69   Resp: 16   Temp: 36.7 °C (98 °F)   TempSrc: Oral   SpO2: 99%   Weight: 51.6 kg (113 lb 12.8 oz)   Height: 1.651 m (5' 5\")     Body mass index is 18.94 kg/m².    Physical Exam  Vitals and nursing " note reviewed.   Constitutional:       Appearance: She is well-developed.   HENT:      Head: Normocephalic.      Right Ear: Tympanic membrane, ear canal and external ear normal.      Left Ear: Tympanic membrane, ear canal and external ear normal.      Nose: Nose normal.      Mouth/Throat:      Pharynx: No posterior oropharyngeal erythema.   Eyes:      Conjunctiva/sclera: Conjunctivae normal.      Pupils: Pupils are equal, round, and reactive to light.   Neck:      Vascular: No JVD.   Cardiovascular:      Rate and Rhythm: Normal rate and regular rhythm.      Heart sounds: No murmur heard.  Pulmonary:      Effort: Pulmonary effort is normal. No respiratory distress.      Breath sounds: Normal breath sounds. No wheezing.   Abdominal:      General: Bowel sounds are normal. There is no distension.      Palpations: Abdomen is soft. There is no mass.      Tenderness: There is no abdominal tenderness. There is no guarding or rebound.      Hernia: No hernia is present.   Musculoskeletal:         General: No tenderness.   Lymphadenopathy:      Cervical: No cervical adenopathy.   Skin:     General: Skin is warm and dry.   Neurological:      Mental Status: She is alert and oriented to person, place, and time.      Sensory: No sensory deficit.      Deep Tendon Reflexes: Reflexes normal.   Psychiatric:         Mood and Affect: Mood normal.         Behavior: Behavior normal.         Assessment/Plan   Diagnoses and all orders for this visit:    Physical exam (Primary)  Comments:  diet/exercise  check labs  up to date on pap  has appt for mammogram  Orders:  -     Lipid panel; Future  -     TSH w reflex FT4; Future  -     Comprehensive metabolic panel; Future  -     CBC and Differential; Future    Shift work sleep disorder  Assessment & Plan:  Continue amodafinil      Acute bilateral low back pain without sciatica  Comments:  prednisone      Current Outpatient Medications   Medication Instructions    armodafiniL (NUVIGIL) 150 mg,  oral, Daily    multivitamin (THERAGRAN) tablet take 1 tablet by oral route  every day    tretinoin (RETIN-A) 0.05 %     Return in about 1 year (around 7/10/2025) for PE.  Patient  agreeable with plan and verbalized understanding

## 2024-07-29 ENCOUNTER — HOSPITAL ENCOUNTER (OUTPATIENT)
Dept: RADIOLOGY | Age: 39
Discharge: HOME | End: 2024-07-29
Attending: FAMILY MEDICINE
Payer: COMMERCIAL

## 2024-07-29 DIAGNOSIS — Z12.31 SCREENING MAMMOGRAM FOR BREAST CANCER: ICD-10-CM

## 2024-07-29 PROCEDURE — 77067 SCR MAMMO BI INCL CAD: CPT

## 2024-07-29 PROCEDURE — 77063 BREAST TOMOSYNTHESIS BI: CPT

## 2024-08-12 DIAGNOSIS — G47.26 SHIFT WORK SLEEP DISORDER: ICD-10-CM

## 2024-08-12 NOTE — TELEPHONE ENCOUNTER
"Medicine Refill Request    Last Office Visit: 7/10/2024   Last Consult Visit: Visit date not found  Last Telemedicine Visit: Visit date not found    Next Appointment: Visit date not found      Current Outpatient Medications:     armodafiniL (NUVIGIL) 150 mg tablet, Take 1 tablet (150 mg total) by mouth daily., Disp: 30 tablet, Rfl: 0    multivitamin (THERAGRAN) tablet, take 1 tablet by oral route  every day, Disp: , Rfl:     tretinoin (RETIN-A) 0.05 % cream, 0.05 %., Disp: , Rfl:       BP Readings from Last 3 Encounters:   07/10/24 96/60   11/21/22 106/72   09/28/22 100/70       Recent Lab results:  Lab Results   Component Value Date    CHOL 155 11/10/2020   ,   Lab Results   Component Value Date    HDL 70 11/10/2020   ,   Lab Results   Component Value Date    LDLCALC 70 11/10/2020   ,   Lab Results   Component Value Date    TRIG 81 11/10/2020        Lab Results   Component Value Date    GLUCOSE 98 11/10/2020   , No results found for: \"HGBA1C\"      Lab Results   Component Value Date    CREATININE 0.80 11/10/2020       Lab Results   Component Value Date    TSH 1.430 11/10/2020         No results found for: \"HGBA1C\"  "

## 2024-08-13 DIAGNOSIS — D64.9 ANEMIA, UNSPECIFIED TYPE: ICD-10-CM

## 2024-08-13 DIAGNOSIS — R73.9 ELEVATED BLOOD SUGAR: Primary | ICD-10-CM

## 2024-08-13 RX ORDER — ARMODAFINIL 150 MG/1
150 TABLET ORAL DAILY
Qty: 30 TABLET | Refills: 0 | Status: SHIPPED | OUTPATIENT
Start: 2024-08-13 | End: 2024-09-26

## 2024-09-26 DIAGNOSIS — G47.26 SHIFT WORK SLEEP DISORDER: ICD-10-CM

## 2024-09-26 RX ORDER — ARMODAFINIL 150 MG/1
150 TABLET ORAL DAILY
Qty: 30 TABLET | Refills: 0 | Status: SHIPPED | OUTPATIENT
Start: 2024-09-26 | End: 2024-10-31

## 2024-10-28 ENCOUNTER — OFFICE VISIT (OUTPATIENT)
Dept: FAMILY MEDICINE | Facility: CLINIC | Age: 39
End: 2024-10-28
Payer: COMMERCIAL

## 2024-10-28 VITALS
OXYGEN SATURATION: 98 % | HEART RATE: 46 BPM | TEMPERATURE: 97.6 F | BODY MASS INDEX: 19.16 KG/M2 | WEIGHT: 115 LBS | DIASTOLIC BLOOD PRESSURE: 68 MMHG | SYSTOLIC BLOOD PRESSURE: 106 MMHG | HEIGHT: 65 IN | RESPIRATION RATE: 14 BRPM

## 2024-10-28 DIAGNOSIS — F41.9 ANXIETY: Primary | ICD-10-CM

## 2024-10-28 PROCEDURE — 99213 OFFICE O/P EST LOW 20 MIN: CPT | Performed by: FAMILY MEDICINE

## 2024-10-28 PROCEDURE — 3008F BODY MASS INDEX DOCD: CPT | Performed by: FAMILY MEDICINE

## 2024-10-28 RX ORDER — LORAZEPAM 0.5 MG/1
0.5 TABLET ORAL EVERY 6 HOURS PRN
Qty: 15 TABLET | Refills: 0 | Status: SHIPPED | OUTPATIENT
Start: 2024-10-28 | End: 2024-11-27

## 2024-10-28 ASSESSMENT — ENCOUNTER SYMPTOMS
WHEEZING: 0
CONSTIPATION: 0
ABDOMINAL PAIN: 0
COUGH: 0
FATIGUE: 0
DYSPHORIC MOOD: 1
SHORTNESS OF BREATH: 0
NAUSEA: 0
DIFFICULTY URINATING: 0
DIARRHEA: 0
APPETITE CHANGE: 0
VOMITING: 0
SLEEP DISTURBANCE: 1
PALPITATIONS: 0
FREQUENCY: 0
NERVOUS/ANXIOUS: 1
ABDOMINAL DISTENTION: 0
NUMBNESS: 0
ACTIVITY CHANGE: 0
LIGHT-HEADEDNESS: 0
WEAKNESS: 0
DIZZINESS: 0

## 2024-10-28 NOTE — PROGRESS NOTES
"Subjective      Patient ID: Alliana K Kalisch is a 39 y.o. female.  1985      HPI  Anxiety  struggling with anxiety around time of menses more irritable easily angered    The following have been reviewed and updated as appropriate in this visit:   Allergies  Meds  Problems       Review of Systems   Constitutional:  Negative for activity change, appetite change and fatigue.   Respiratory:  Negative for cough, shortness of breath and wheezing.    Cardiovascular:  Negative for chest pain, palpitations and leg swelling.   Gastrointestinal:  Negative for abdominal distention, abdominal pain, constipation, diarrhea, nausea and vomiting.   Endocrine: Negative for cold intolerance and heat intolerance.   Genitourinary:  Negative for difficulty urinating and frequency.   Skin:  Negative for rash.   Neurological:  Negative for dizziness, weakness, light-headedness and numbness.   Psychiatric/Behavioral:  Positive for dysphoric mood and sleep disturbance. The patient is nervous/anxious (during menses).    All other systems reviewed and are negative.      Objective     Vitals:    10/28/24 0842   BP: 106/68   BP Location: Right upper arm   Patient Position: Sitting   Pulse: (!) 46   Resp: 14   Temp: 36.4 °C (97.6 °F)   TempSrc: Oral   SpO2: 98%   Weight: 52.2 kg (115 lb)   Height: 1.651 m (5' 5\")     Body mass index is 19.14 kg/m².    Physical Exam  Vitals and nursing note reviewed.   Constitutional:       Appearance: Normal appearance.   Cardiovascular:      Rate and Rhythm: Normal rate and regular rhythm.   Pulmonary:      Effort: Pulmonary effort is normal.      Breath sounds: No wheezing or rhonchi.   Neurological:      Mental Status: She is alert and oriented to person, place, and time.   Psychiatric:         Mood and Affect: Mood normal.         Behavior: Behavior normal.         Assessment & Plan  Anxiety  Ativan prn  Orders:    LORazepam (ATIVAN) 0.5 mg tablet; Take 1 tablet (0.5 mg total) by mouth every 6 (six) " hours as needed for anxiety.    Return if symptoms worsen or fail to improve.  Patient  agreeable with plan and verbalized understanding

## 2024-10-30 DIAGNOSIS — G47.26 SHIFT WORK SLEEP DISORDER: ICD-10-CM

## 2024-10-31 RX ORDER — ARMODAFINIL 150 MG/1
150 TABLET ORAL DAILY
Qty: 30 TABLET | Refills: 0 | Status: SHIPPED | OUTPATIENT
Start: 2024-10-31 | End: 2024-12-12

## 2024-12-11 DIAGNOSIS — G47.26 SHIFT WORK SLEEP DISORDER: ICD-10-CM

## 2024-12-12 RX ORDER — ARMODAFINIL 150 MG/1
150 TABLET ORAL DAILY
Qty: 30 TABLET | Refills: 0 | Status: SHIPPED | OUTPATIENT
Start: 2024-12-12 | End: 2025-01-29

## 2025-01-16 ENCOUNTER — TELEPHONE (OUTPATIENT)
Dept: FAMILY MEDICINE | Facility: CLINIC | Age: 40
End: 2025-01-16

## 2025-01-16 NOTE — TELEPHONE ENCOUNTER
Patient is requesting that her records be faxed to the hematologist office    Patient can't make appt till they have the records.    [facility]  Fax [phone]

## 2025-01-29 DIAGNOSIS — G47.26 SHIFT WORK SLEEP DISORDER: ICD-10-CM

## 2025-01-29 RX ORDER — ARMODAFINIL 150 MG/1
150 TABLET ORAL DAILY
Qty: 30 TABLET | Refills: 0 | Status: SHIPPED | OUTPATIENT
Start: 2025-01-29 | End: 2025-03-26

## 2025-03-25 DIAGNOSIS — G47.26 SHIFT WORK SLEEP DISORDER: ICD-10-CM

## 2025-03-26 RX ORDER — ARMODAFINIL 150 MG/1
150 TABLET ORAL DAILY
Qty: 30 TABLET | Refills: 0 | Status: SHIPPED | OUTPATIENT
Start: 2025-03-26 | End: 2025-05-27

## 2025-05-27 DIAGNOSIS — G47.26 SHIFT WORK SLEEP DISORDER: ICD-10-CM

## 2025-05-27 RX ORDER — ARMODAFINIL 150 MG/1
150 TABLET ORAL DAILY
Qty: 30 TABLET | Refills: 0 | Status: SHIPPED | OUTPATIENT
Start: 2025-05-27

## 2025-06-09 DIAGNOSIS — F41.9 ANXIETY: ICD-10-CM

## 2025-06-09 RX ORDER — LORAZEPAM 0.5 MG/1
0.5 TABLET ORAL EVERY 6 HOURS PRN
Qty: 15 TABLET | Refills: 0 | Status: SHIPPED | OUTPATIENT
Start: 2025-06-09

## 2025-06-09 NOTE — TELEPHONE ENCOUNTER
"Medicine Refill Request    Last Office Visit: 10/28/2024   Last Consult Visit: Visit date not found  Last Telemedicine Visit: Visit date not found    Next Appointment: Visit date not found      Current Outpatient Medications:     armodafiniL (NUVIGIL) 150 mg tablet, Take 1 tablet (150 mg total) by mouth daily., Disp: 30 tablet, Rfl: 0    LORazepam (ATIVAN) 0.5 mg tablet, Take 1 tablet (0.5 mg total) by mouth every 6 (six) hours as needed for anxiety., Disp: 15 tablet, Rfl: 0    multivitamin (THERAGRAN) tablet, take 1 tablet by oral route  every day, Disp: , Rfl:     tretinoin (RETIN-A) 0.05 % cream, 0.05 %., Disp: , Rfl:     BP Readings from Last 3 Encounters:   10/28/24 106/68   07/10/24 96/60   11/21/22 106/72       Recent Lab results:  Lab Results   Component Value Date    CHOL 155 11/10/2020   ,   Lab Results   Component Value Date    HDL 70 11/10/2020   ,   Lab Results   Component Value Date    LDLCALC 70 11/10/2020   ,   Lab Results   Component Value Date    TRIG 81 11/10/2020        Lab Results   Component Value Date    GLUCOSE 98 11/10/2020   , No results found for: \"HGBA1C\"      Lab Results   Component Value Date    CREATININE 0.80 11/10/2020       Lab Results   Component Value Date    TSH 1.430 11/10/2020         No results found for: \"HGBA1C\"  "

## 2025-07-18 DIAGNOSIS — G47.26 SHIFT WORK SLEEP DISORDER: ICD-10-CM

## 2025-07-20 RX ORDER — ARMODAFINIL 150 MG/1
150 TABLET ORAL DAILY
Qty: 30 TABLET | Refills: 0 | Status: SHIPPED | OUTPATIENT
Start: 2025-07-20

## 2025-08-21 ENCOUNTER — HOSPITAL ENCOUNTER (OUTPATIENT)
Dept: RADIOLOGY | Age: 40
Discharge: HOME | End: 2025-08-21
Attending: GENERAL ACUTE CARE HOSPITAL
Payer: COMMERCIAL

## 2025-08-21 DIAGNOSIS — Z12.31 SCREENING MAMMOGRAM, ENCOUNTER FOR: ICD-10-CM

## 2025-08-21 PROCEDURE — 77067 SCR MAMMO BI INCL CAD: CPT
